# Patient Record
Sex: MALE | Race: WHITE
[De-identification: names, ages, dates, MRNs, and addresses within clinical notes are randomized per-mention and may not be internally consistent; named-entity substitution may affect disease eponyms.]

---

## 2019-09-27 ENCOUNTER — HOSPITAL ENCOUNTER (OUTPATIENT)
Dept: HOSPITAL 62 - OD | Age: 84
End: 2019-09-27
Attending: NURSE PRACTITIONER
Payer: OTHER GOVERNMENT

## 2019-09-27 DIAGNOSIS — Z79.01: ICD-10-CM

## 2019-09-27 DIAGNOSIS — Z51.81: Primary | ICD-10-CM

## 2019-09-27 DIAGNOSIS — Z92.29: ICD-10-CM

## 2019-09-27 LAB
INR PPP: 1.54
PROTHROMBIN TIME: 18.6 SEC (ref 11.4–15.4)

## 2019-09-27 PROCEDURE — 36415 COLL VENOUS BLD VENIPUNCTURE: CPT

## 2019-09-27 PROCEDURE — 85610 PROTHROMBIN TIME: CPT

## 2019-10-10 ENCOUNTER — HOSPITAL ENCOUNTER (EMERGENCY)
Dept: HOSPITAL 62 - ER | Age: 84
Discharge: HOME | End: 2019-10-10
Payer: OTHER GOVERNMENT

## 2019-10-10 VITALS — DIASTOLIC BLOOD PRESSURE: 81 MMHG | SYSTOLIC BLOOD PRESSURE: 155 MMHG

## 2019-10-10 DIAGNOSIS — Z79.899: ICD-10-CM

## 2019-10-10 DIAGNOSIS — I50.9: Primary | ICD-10-CM

## 2019-10-10 DIAGNOSIS — R06.01: ICD-10-CM

## 2019-10-10 DIAGNOSIS — R06.02: ICD-10-CM

## 2019-10-10 DIAGNOSIS — I25.2: ICD-10-CM

## 2019-10-10 DIAGNOSIS — J90: ICD-10-CM

## 2019-10-10 LAB
ADD MANUAL DIFF: NO
ALBUMIN SERPL-MCNC: 4 G/DL (ref 3.5–5)
ALP SERPL-CCNC: 84 U/L (ref 38–126)
ANION GAP SERPL CALC-SCNC: 10 MMOL/L (ref 5–19)
APTT BLD: 45.9 SEC (ref 23.5–35.8)
AST SERPL-CCNC: 20 U/L (ref 17–59)
BASOPHILS # BLD AUTO: 0 10^3/UL (ref 0–0.2)
BASOPHILS NFR BLD AUTO: 1 % (ref 0–2)
BILIRUB DIRECT SERPL-MCNC: 0.2 MG/DL (ref 0–0.4)
BILIRUB SERPL-MCNC: 0.9 MG/DL (ref 0.2–1.3)
BUN SERPL-MCNC: 34 MG/DL (ref 7–20)
CALCIUM: 9.4 MG/DL (ref 8.4–10.2)
CHLORIDE SERPL-SCNC: 102 MMOL/L (ref 98–107)
CK MB SERPL-MCNC: 2.46 NG/ML (ref ?–4.55)
CK SERPL-CCNC: 55 U/L (ref 55–170)
CO2 SERPL-SCNC: 27 MMOL/L (ref 22–30)
EOSINOPHIL # BLD AUTO: 0.1 10^3/UL (ref 0–0.6)
EOSINOPHIL NFR BLD AUTO: 1.6 % (ref 0–6)
ERYTHROCYTE [DISTWIDTH] IN BLOOD BY AUTOMATED COUNT: 17.7 % (ref 11.5–14)
GLUCOSE SERPL-MCNC: 165 MG/DL (ref 75–110)
HCT VFR BLD CALC: 34.1 % (ref 37.9–51)
HGB BLD-MCNC: 11 G/DL (ref 13.5–17)
INR PPP: 2.71
LYMPHOCYTES # BLD AUTO: 0.5 10^3/UL (ref 0.5–4.7)
LYMPHOCYTES NFR BLD AUTO: 11.6 % (ref 13–45)
MCH RBC QN AUTO: 26 PG (ref 27–33.4)
MCHC RBC AUTO-ENTMCNC: 32.4 G/DL (ref 32–36)
MCV RBC AUTO: 80 FL (ref 80–97)
MONOCYTES # BLD AUTO: 0.3 10^3/UL (ref 0.1–1.4)
MONOCYTES NFR BLD AUTO: 7.2 % (ref 3–13)
NEUTROPHILS # BLD AUTO: 3.6 10^3/UL (ref 1.7–8.2)
NEUTS SEG NFR BLD AUTO: 78.6 % (ref 42–78)
PLATELET # BLD: 209 10^3/UL (ref 150–450)
POTASSIUM SERPL-SCNC: 4.3 MMOL/L (ref 3.6–5)
PROT SERPL-MCNC: 7.2 G/DL (ref 6.3–8.2)
PROTHROMBIN TIME: 29.3 SEC (ref 11.4–15.4)
RBC # BLD AUTO: 4.24 10^6/UL (ref 4.35–5.55)
TOTAL CELLS COUNTED % (AUTO): 100 %
TROPONIN I SERPL-MCNC: 0.02 NG/ML
WBC # BLD AUTO: 4.6 10^3/UL (ref 4–10.5)

## 2019-10-10 PROCEDURE — 83880 ASSAY OF NATRIURETIC PEPTIDE: CPT

## 2019-10-10 PROCEDURE — 71046 X-RAY EXAM CHEST 2 VIEWS: CPT

## 2019-10-10 PROCEDURE — 85610 PROTHROMBIN TIME: CPT

## 2019-10-10 PROCEDURE — 82550 ASSAY OF CK (CPK): CPT

## 2019-10-10 PROCEDURE — 74018 RADEX ABDOMEN 1 VIEW: CPT

## 2019-10-10 PROCEDURE — 85025 COMPLETE CBC W/AUTO DIFF WBC: CPT

## 2019-10-10 PROCEDURE — 80053 COMPREHEN METABOLIC PANEL: CPT

## 2019-10-10 PROCEDURE — 93005 ELECTROCARDIOGRAM TRACING: CPT

## 2019-10-10 PROCEDURE — 82553 CREATINE MB FRACTION: CPT

## 2019-10-10 PROCEDURE — 84484 ASSAY OF TROPONIN QUANT: CPT

## 2019-10-10 PROCEDURE — 99283 EMERGENCY DEPT VISIT LOW MDM: CPT

## 2019-10-10 PROCEDURE — 93010 ELECTROCARDIOGRAM REPORT: CPT

## 2019-10-10 PROCEDURE — 82962 GLUCOSE BLOOD TEST: CPT

## 2019-10-10 PROCEDURE — 36415 COLL VENOUS BLD VENIPUNCTURE: CPT

## 2019-10-10 PROCEDURE — 96374 THER/PROPH/DIAG INJ IV PUSH: CPT

## 2019-10-10 PROCEDURE — 85730 THROMBOPLASTIN TIME PARTIAL: CPT

## 2019-10-10 NOTE — ER DOCUMENT REPORT
ED General





- General


Chief Complaint: Epigastric Pain


Stated Complaint: SHORTNESS OF BREATH


Time Seen by Provider: 10/10/19 14:00


Primary Care Provider: 


BLAKE KENDRICK NP [NO LOCAL MD] - Follow up as needed


Mode of Arrival: Ambulatory


TRAVEL OUTSIDE OF THE [place] IN LAST 30 DAYS: No





- HPI


Patient complains to provider of: SOB


Notes: 





Increasing shortness of breath and orthopnea for 2 weeks.  Patient has a lengthy

history of congestive heart failure.  He states he having difficulty sleeping 

secondary to liza shortness of breath but gets them in the middle of the night.

 Patient does take Lasix twice daily.  But he has been taking his last dose at 

11 PM.





She denies chest pain, fever, chills, cough.





- Related Data


Allergies/Adverse Reactions: 


                                        





No Known Allergies Allergy (Unverified 10/10/19 14:08)


   











Past Medical History





- General


Information source: Patient





- Social History


Smoking Status: Never Smoker


Chew tobacco use (# tins/day): No


Frequency of alcohol use: None


Drug Abuse: None


Family History: None


Patient has suicidal ideation: No


Patient has homicidal ideation: No





- Past Medical History


Cardiac Medical History: Reports: Hx Heart Attack





Review of Systems





- Review of Systems


Notes: 





REVIEW OF SYSTEMS:


CONSTITUTIONAL: -fevers, -chills


EENT: -eye pain, -difficulty swallowing, -nasal congestion


CARDIOVASCULAR: -chest pain, -syncope, positive orthopnea


RESPIRATORY: -cough, positive SOB


GASTROINTESTINAL: -abdominal pain, -nausea, -vomiting, -diarrhea


GENITOURINARY: -dysuria, -hematuria


MUSCULOSKELETAL: -back pain, -neck pain


SKIN: -rash or skin lesions.


HEMATOLOGIC: -easy bruising or bleeding.


LYMPHATIC: -swollen, enlarged glands.


NEUROLOGICAL: -altered mental status or loss of consciousness, -headache, -

neurologic symptoms


PSYCHIATRIC: -anxiety, -depression.


ALL OTHER SYSTEMS REVIEWED AND NEGATIVE.





Physical Exam





- Vital signs


Vitals: 


                                        











Temp Pulse Resp BP Pulse Ox


 


 97.5 F   70   16   125/63   98 


 


 10/10/19 14:00  10/10/19 14:00  10/10/19 14:00  10/10/19 14:00  10/10/19 14:00














- Notes


Notes: 





PHYSICAL EXAMINATION:


GENERAL: Well-appearing, well-nourished and in no acute distress.


HEAD: Atraumatic, normocephalic.


EYES: Pupils equal round and reactive to light, extraocular movements intact, 

sclera anicteric, conjunctiva are normal.


ENT: nares patent, oropharynx clear without exudates.  Moist mucous membranes.


NECK: Normal range of motion, supple without lymphadenopathy


LUNGS: Bilateral basilar crackles


HEART: Regular rate and rhythm without murmurs


ABDOMEN: Soft, nontender, normoactive bowel sounds.  No guarding, no rebound.  

No masses appreciated.


EXTREMITIES: Normal range of motion, no pitting or edema.  No cyanosis.


NEUROLOGICAL: Cranial nerves grossly intact.  Normal speech, normal gait.  

Normal sensory and motor exams.


PSYCH: Normal mood, normal affect.


SKIN: Warm, Dry, normal turgor, no rashes or lesions noted.





Course





- Re-evaluation


Re-evalutation: 





10/10/19 16:07


Well-appearing 85-year-old male presents with 2 weeks of increasing symptoms of 

orthopnea.


10/10/19 16:45





Patient's chest x-ray is some small pleural effusions but no other acute 

processes.  Patient is afebrile, no leukocytosis.  No cough.  No history of 

pneumonia.  Patient's EKG is nonischemic, troponin near normal.  Patient does 

have severe elevation is proBNP.





Patient is scheduled to see his family doctor on Monday.  Patient had his Lasix 

cut in half about 2 months ago.  He is now taking only 20 mg in the morning and 

20 mg afternoon.  Encourage patient to go back to 40 mg in the morning 40 mg at 

night.  Patient will be given IV dose of Lasix in the emergency department.  

Will be discharged home improved return if anything changes follow-up with PCP 

on Monday.





- Vital Signs


Vital signs: 


                                        











Temp Pulse Resp BP Pulse Ox


 


 97.5 F   70   14   135/75 H  98 


 


 10/10/19 14:00  10/10/19 14:00  10/10/19 15:01  10/10/19 15:01  10/10/19 15:01














- Laboratory


Result Diagrams: 


                                 10/10/19 14:55





                                 10/10/19 14:55


Laboratory results interpreted by me: 


                                        











  10/10/19 10/10/19 10/10/19





  14:55 14:55 14:55


 


RBC  4.24 L  


 


Hgb  11.0 L  


 


Hct  34.1 L  


 


MCH  26.0 L  


 


RDW  17.7 H  


 


Lymph % (Auto)  11.6 L  


 


Seg Neutrophils %  78.6 H  


 


APTT   45.9 H 


 


BUN    34 H


 


Creatinine    2.11 H


 


Est GFR ( Amer)    36 L


 


Est GFR (MDRD) Non-Af    30 L


 


Glucose    165 H


 


NT-Pro-B Natriuret Pep   














  10/10/19





  14:55


 


RBC 


 


Hgb 


 


Hct 


 


MCH 


 


RDW 


 


Lymph % (Auto) 


 


Seg Neutrophils % 


 


APTT 


 


BUN 


 


Creatinine 


 


Est GFR ( Amer) 


 


Est GFR (MDRD) Non-Af 


 


Glucose 


 


NT-Pro-B Natriuret Pep  34090 H














- EKG Interpretation by Me


Additional EKG results interpreted by me: 





10/10/19 15:58


Atrial sensed pacing rhythm 74 bpm, no ST elevations or depressions, no tacky 

dysrhythmias.





Discharge





- Discharge


Clinical Impression: 


CHF (congestive heart failure)


Qualifiers:


 Heart failure type: unspecified Heart failure chronicity: acute on chronic 

Qualified Code(s): I50.9 - Heart failure, unspecified





Condition: Stable


Disposition: HOME, SELF-CARE


Instructions:  Congestive Heart Failure ([facility])


Referrals: 


BLAKE KENDRICK NP [NO LOCAL MD] - Follow up as needed

## 2019-10-10 NOTE — RADIOLOGY REPORT (SQ)
EXAM DESCRIPTION:  KUB/ABDOMEN (SINGLE VIEW)



COMPLETED DATE/TIME:  10/10/2019 2:34 pm



REASON FOR STUDY:  ABD PRESSURE



COMPARISON:  None.



NUMBER OF VIEWS:  One view.



TECHNIQUE:   Supine radiographic image of the abdomen acquired.



LIMITATIONS:  None.



FINDINGS:  BOWEL GAS PATTERN: Normal bowel gas pattern. No dilated loops.

CALCIFICATIONS: No suspicious calcifications.

SOFT TISSUES: No gross mass or suggestion of organomegaly.

HARDWARE: None in the abdomen.

BONES: No acute fracture. No worrisome bone lesions.

OTHER: No other significant finding.



IMPRESSION:  NO RADIOGRAPHIC EVIDENCE FOR ACUTE ABDOMINAL DISEASE.



TECHNICAL DOCUMENTATION:  JOB ID:  [id number]

 2011 [employer]- All Rights Reserved



Reading location - IP/workstation name: [email or web address]

## 2019-10-10 NOTE — EKG REPORT
SEVERITY:- ABNORMAL ECG -

ATRIAL-SENSED VENTRICULAR-PACED COMPLEXES

PROBABLE LEFT ATRIAL ABNORMALITY

LEFT BUNDLE BRANCH BLOCK

:

Confirmed by: Lucas Reynoso MD 10-Oct-2019 19:00:33

## 2019-10-10 NOTE — ER DOCUMENT REPORT
ED Medical Screen (RME)





- General


Chief Complaint: Shortness Of Breath


Stated Complaint: SHORTNESS OF BREATH


Time Seen by Provider: 10/10/19 14:00


Primary Care Provider: 


BLAKE KENDRICK NP [Primary Care Provider] - Follow up as needed


Mode of Arrival: Ambulatory


Information source: Patient


Notes: 





This 85-year-old male with history of cardiac disease and diabetes presents 

emergency department with complaints of urinary frequency decreased sleep 

abdominal pressure for the past 2 weeks.  Denies chest pain.  Reports he tried 

to go to see his primary care provider but they were not able to see him and 

gave an appointment for Monday.  Reports his sugars have been in the 200s.








I have greeted and performed a rapid initial assessment of this patient.  A 

comprehensive ED assessment and evaluation of the patient, analysis of test 

results and completion of the medical decision making process will be conducted 

by additional ED providers.


Dictation of this chart was performed using voice recognition software; 

therefore, there may be some unintended grammatical errors.


TRAVEL OUTSIDE OF THE [place] IN LAST 30 DAYS: No





Doctor's Discharge





- Discharge


Referrals: 


BLAKE KENDRICK NP [Primary Care Provider] - Follow up as needed

## 2019-10-10 NOTE — RADIOLOGY REPORT (SQ)
EXAM DESCRIPTION:  CHEST 2 VIEWS



COMPLETED DATE/TIME:  10/10/2019 2:34 pm



REASON FOR STUDY:  ABD PAIN HX MI



COMPARISON:  None.



EXAM PARAMETERS:  NUMBER OF VIEWS: two views

TECHNIQUE: Digital Frontal and Lateral radiographic views of the chest acquired.

RADIATION DOSE: NA

LIMITATIONS: none



FINDINGS:  LUNGS AND PLEURA: Bilateral pleural effusions.  Airspace disease in each lower lobe.

MEDIASTINUM AND HILAR STRUCTURES: No masses or contour abnormalities.

HEART AND VASCULAR STRUCTURES: Heart size is borderline.  No liza pulmonary edema.

BONES: No acute findings.

HARDWARE: Pacemaker.  Sternotomy wires.

OTHER: No other significant finding.



IMPRESSION:  Borderline cardiomegaly with no pulmonary edema.  Small pleural effusions.  Airspace dis
ease in each lower lobe, atelectasis versus pneumonia.



TECHNICAL DOCUMENTATION:  JOB ID:  [id number]

 2011 [employer]- All Rights Reserved



Reading location - IP/workstation name: [email or web address]

## 2019-12-03 ENCOUNTER — HOSPITAL ENCOUNTER (OUTPATIENT)
Dept: HOSPITAL 62 - RAD | Age: 84
End: 2019-12-03
Attending: INTERNAL MEDICINE
Payer: OTHER GOVERNMENT

## 2019-12-03 DIAGNOSIS — I31.3: Primary | ICD-10-CM

## 2019-12-03 PROCEDURE — 82565 ASSAY OF CREATININE: CPT

## 2019-12-03 PROCEDURE — 71250 CT THORAX DX C-: CPT

## 2019-12-03 NOTE — RADIOLOGY REPORT (SQ)
EXAM DESCRIPTION:  CT CHEST WITHOUT



COMPLETED DATE/TIME:  12/3/2019 3:09 pm



REASON FOR STUDY:  I31.3 PERICARDIAL EFFUSION (NONINFLAMMATORY) I31.3  PERICARDIAL EFFUSION (NONINFLA
MMATORY)



COMPARISON:  Chest x-ray dated 10/10/2019



TECHNIQUE:  CT scan performed of the chest without intravenous contrast.  Images reviewed with lung, 
soft tissue and bone windows.  Reconstructed coronal and sagittal MPR images reviewed.  All images st
ored on PACS.

All CT scanners at this facility use dose modulation, iterative reconstruction, and/or weight based d
osing when appropriate to reduce radiation dose to as low as reasonably achievable (ALARA).

[facility]: Dose Right  [facility]: CareDose    [facility]: Dose Right    [facility]: Teradose 4D    [facility]: Smart Technologies



RADIATION DOSE:  CT Rad equipment meets quality standard of care and radiation dose reduction techniq
ues were employed. CTDIvol: 5.8 mGy. DLP: 245 mGy-cm. mGy.



LIMITATIONS:  No technical limitations.



FINDINGS:  LUNGS AND PLEURA: There are moderate bilateral pleural effusions right greater than left. 
 There is basilar atelectasis.

HILAR AND MEDIASTINAL STRUCTURES: No identified masses or abnormal nodes.  No obvious aneurysm.

HEART AND VASCULAR STRUCTURES: No pericardial effusion.  No aneurysm.  Extensive coronary artery calc
ification.

UPPER ABDOMEN: No significant findings.  Limited exam.

THYROID AND OTHER SOFT TISSUES: No masses.  No adenopathy.

BONES: No acute findings.

HARDWARE: Battery pack and leads are in place along with sternotomy wires.

OTHER: No other significant findings.



IMPRESSION:  Moderate bilateral pleural effusions right greater than left.  Bibasilar atelectasis.  N
o pericardial effusion.



TECHNICAL DOCUMENTATION:  JOB ID:  [id number]

Quality ID # [id number]: Final reports with documentation of one or more dose reduction techniques (e.g., Au
tomated exposure control, adjustment of the mA and/or kV according to patient size, use of iterative 
reconstruction technique)

 2011 [employer]- All Rights Reserved



Reading location - IP/workstation name: [email or web address]

## 2020-01-21 ENCOUNTER — HOSPITAL ENCOUNTER (OUTPATIENT)
Dept: HOSPITAL 62 - RAD | Age: 85
End: 2020-01-21
Attending: INTERNAL MEDICINE
Payer: OTHER GOVERNMENT

## 2020-01-21 DIAGNOSIS — Q61.02: ICD-10-CM

## 2020-01-21 DIAGNOSIS — K80.20: ICD-10-CM

## 2020-01-21 DIAGNOSIS — N18.4: Primary | ICD-10-CM

## 2020-01-21 DIAGNOSIS — J90: ICD-10-CM

## 2020-01-21 PROCEDURE — 76770 US EXAM ABDO BACK WALL COMP: CPT

## 2020-01-21 NOTE — RADIOLOGY REPORT (SQ)
EXAM DESCRIPTION:  U/S RETROPERITON (RENAL/AORTA)



COMPLETED DATE/TIME:  1/21/2020 3:55 pm



REASON FOR STUDY:  N18.4 CHRONIC KIDNEY DISEASE, STAGE 4 (SEVERE) N18.4  CHRONIC KIDNEY DISEASE, STAG
E 4 (SEVERE)



COMPARISON:  None.



TECHNIQUE:  Dynamic and static grayscale images acquired of the kidneys and bladder and recorded on P
ACS. Additional selected color Doppler and spectral images recorded.



LIMITATIONS:  None.



FINDINGS:  RIGHT KIDNEY:  8.2 cm.   Normal echogenicity.   No solid or suspicious masses.   No hydron
ephrosis.   No calcifications.

LEFT KIDNEY:  8.5 cm.   Normal echogenicity.   Cyst in the mid kidney measuring 1.6 cm and cyst in th
e lower pole measuring 2.8 cm.  No solid or suspicious masses.   No hydronephrosis.   No calcificatio
ns.

BLADDER: No masses.

OTHER FINDINGS: Bilateral pleural effusions.  Stones and sludge in the gallbladder.



IMPRESSION:

1. SMALL KIDNEYS.  CYSTS IN THE LEFT KIDNEY.  NO HYDRONEPHROSIS OR ACUTE FINDINGS.

2. PLEURAL EFFUSIONS.  STONES AND SLUDGE IN THE GALLBLADDER.



TECHNICAL DOCUMENTATION:  JOB ID:  [id number]

 2011 [employer]- All Rights Reserved



Reading location - IP/workstation name: [email or web address]

## 2020-04-21 ENCOUNTER — HOSPITAL ENCOUNTER (EMERGENCY)
Dept: HOSPITAL 62 - ER | Age: 85
LOS: 1 days | Discharge: ANOTHER HOSPITAL | End: 2020-04-22
Payer: OTHER GOVERNMENT

## 2020-04-21 DIAGNOSIS — N18.4: ICD-10-CM

## 2020-04-21 DIAGNOSIS — L97.519: ICD-10-CM

## 2020-04-21 DIAGNOSIS — L03.115: ICD-10-CM

## 2020-04-21 DIAGNOSIS — J44.9: ICD-10-CM

## 2020-04-21 DIAGNOSIS — Z95.0: ICD-10-CM

## 2020-04-21 DIAGNOSIS — M86.9: ICD-10-CM

## 2020-04-21 DIAGNOSIS — E10.22: ICD-10-CM

## 2020-04-21 DIAGNOSIS — I35.0: ICD-10-CM

## 2020-04-21 DIAGNOSIS — N17.9: ICD-10-CM

## 2020-04-21 DIAGNOSIS — I50.9: ICD-10-CM

## 2020-04-21 DIAGNOSIS — E10.621: Primary | ICD-10-CM

## 2020-04-21 DIAGNOSIS — L97.529: ICD-10-CM

## 2020-04-21 DIAGNOSIS — I48.91: ICD-10-CM

## 2020-04-21 DIAGNOSIS — Z95.5: ICD-10-CM

## 2020-04-21 DIAGNOSIS — I13.0: ICD-10-CM

## 2020-04-21 DIAGNOSIS — N40.0: ICD-10-CM

## 2020-04-21 DIAGNOSIS — Z79.4: ICD-10-CM

## 2020-04-21 DIAGNOSIS — Z79.899: ICD-10-CM

## 2020-04-21 DIAGNOSIS — Z79.82: ICD-10-CM

## 2020-04-21 DIAGNOSIS — E10.69: ICD-10-CM

## 2020-04-21 DIAGNOSIS — I25.10: ICD-10-CM

## 2020-04-21 DIAGNOSIS — I25.2: ICD-10-CM

## 2020-04-21 LAB
ABSOLUTE LYMPHOCYTES# (MANUAL): 0.2 10^3/UL (ref 0.5–4.7)
ABSOLUTE MONOCYTES # (MANUAL): 0.4 10^3/UL (ref 0.1–1.4)
ADD MANUAL DIFF: YES
ALBUMIN SERPL-MCNC: 3.1 G/DL (ref 3.5–5)
ALP SERPL-CCNC: 95 U/L (ref 38–126)
ANION GAP SERPL CALC-SCNC: 16 MMOL/L (ref 5–19)
ANISOCYTOSIS BLD QL SMEAR: (no result)
APTT BLD: 67.7 SEC (ref 23.5–35.8)
AST SERPL-CCNC: 18 U/L (ref 17–59)
BASOPHILS NFR BLD MANUAL: 0 % (ref 0–2)
BILIRUB DIRECT SERPL-MCNC: 0.2 MG/DL (ref 0–0.4)
BILIRUB SERPL-MCNC: 0.9 MG/DL (ref 0.2–1.3)
BUN SERPL-MCNC: 67 MG/DL (ref 7–20)
CALCIUM: 8.8 MG/DL (ref 8.4–10.2)
CHLORIDE SERPL-SCNC: 99 MMOL/L (ref 98–107)
CO2 SERPL-SCNC: 18 MMOL/L (ref 22–30)
EOSINOPHIL NFR BLD MANUAL: 0 % (ref 0–6)
ERYTHROCYTE [DISTWIDTH] IN BLOOD BY AUTOMATED COUNT: 16.3 % (ref 11.5–14)
GLUCOSE SERPL-MCNC: 189 MG/DL (ref 75–110)
HCT VFR BLD CALC: 29.9 % (ref 37.9–51)
HGB BLD-MCNC: 9.8 G/DL (ref 13.5–17)
INR PPP: 8.47
MCH RBC QN AUTO: 25.8 PG (ref 27–33.4)
MCHC RBC AUTO-ENTMCNC: 32.7 G/DL (ref 32–36)
MCV RBC AUTO: 79 FL (ref 80–97)
MONOCYTES % (MANUAL): 2 % (ref 3–13)
OVALOCYTES BLD QL SMEAR: SLIGHT
PLATELET # BLD: 307 10^3/UL (ref 150–450)
PLATELET COMMENT: ADEQUATE
POIKILOCYTOSIS BLD QL SMEAR: SLIGHT
POTASSIUM SERPL-SCNC: 3.5 MMOL/L (ref 3.6–5)
PROT SERPL-MCNC: 6.5 G/DL (ref 6.3–8.2)
PROTHROMBIN TIME: 72.9 SEC (ref 11.4–15.4)
RBC # BLD AUTO: 3.79 10^6/UL (ref 4.35–5.55)
SEGMENTED NEUTROPHILS % (MAN): 97 % (ref 42–78)
TOTAL CELLS COUNTED BLD: 100
VARIANT LYMPHS NFR BLD MANUAL: 1 % (ref 13–45)
WBC # BLD AUTO: 21.5 10^3/UL (ref 4–10.5)

## 2020-04-21 PROCEDURE — 87205 SMEAR GRAM STAIN: CPT

## 2020-04-21 PROCEDURE — 80053 COMPREHEN METABOLIC PANEL: CPT

## 2020-04-21 PROCEDURE — 85025 COMPLETE CBC W/AUTO DIFF WBC: CPT

## 2020-04-21 PROCEDURE — 85610 PROTHROMBIN TIME: CPT

## 2020-04-21 PROCEDURE — 93010 ELECTROCARDIOGRAM REPORT: CPT

## 2020-04-21 PROCEDURE — 87077 CULTURE AEROBIC IDENTIFY: CPT

## 2020-04-21 PROCEDURE — 73630 X-RAY EXAM OF FOOT: CPT

## 2020-04-21 PROCEDURE — 93005 ELECTROCARDIOGRAM TRACING: CPT

## 2020-04-21 PROCEDURE — 87040 BLOOD CULTURE FOR BACTERIA: CPT

## 2020-04-21 PROCEDURE — 82962 GLUCOSE BLOOD TEST: CPT

## 2020-04-21 PROCEDURE — 85730 THROMBOPLASTIN TIME PARTIAL: CPT

## 2020-04-21 PROCEDURE — 93306 TTE W/DOPPLER COMPLETE: CPT

## 2020-04-21 PROCEDURE — 87070 CULTURE OTHR SPECIMN AEROBIC: CPT

## 2020-04-21 PROCEDURE — 87186 SC STD MICRODIL/AGAR DIL: CPT

## 2020-04-21 PROCEDURE — 99285 EMERGENCY DEPT VISIT HI MDM: CPT

## 2020-04-21 PROCEDURE — 36415 COLL VENOUS BLD VENIPUNCTURE: CPT

## 2020-04-21 PROCEDURE — 96365 THER/PROPH/DIAG IV INF INIT: CPT

## 2020-04-21 PROCEDURE — 96361 HYDRATE IV INFUSION ADD-ON: CPT

## 2020-04-21 NOTE — ER DOCUMENT REPORT
ED Extremity Problem, Lower





- General


Information source: Patient


TRAVEL OUTSIDE OF THE [place] IN LAST 30 DAYS: No





- HPI


Patient complains to provider of: Pain


Location: Foot


Occurred: Last week


Onset/Duration: Worse


Quality of pain: Achy


Pain Level: 2


Recent injury: No


Associated symptoms: denies: Chills, Fever, Rapid heart rate


Exacerbated by: Movement, Walking


Relieved by: Nothing





<MARÍA BARRERA - Last Filed: 04/21/20 16:40>





<OPAL BENITEZ - Last Filed: 04/21/20 21:13>





<RHONDA HERNANDEZ - Last Filed: 04/22/20 00:33>





- General


Chief Complaint: Feet Swelling


Stated Complaint: FEET SWEELING


Time Seen by Provider: 04/21/20 08:11


Primary Care Provider: 


SUKHI MCKEE DO [Primary Care Provider] - Follow up as needed


Notes: 





Patient presents with diabetic foot wounds to bilateral feet right worse than 

left.  Patient states that he is not currently followed by podiatrist and does 

not see wound care.  Patient states that he has been performing vinegar and wa

ter soaks at home.  Patient denies any fever or currently taking any antibiotics

(BRUCE,KELTSSHERIDAN)





- Related Data


Allergies/Adverse Reactions: 


                                        





No Known Allergies Allergy (Verified 04/21/20 09:18)


   











Past Medical History





- General


Information source: Patient





- Social History


Smoking Status: Never Smoker


Chew tobacco use (# tins/day): No


Frequency of alcohol use: None


Drug Abuse: None


Occupation: none


Family History: None


Patient has suicidal ideation: No


Patient has homicidal ideation: No





- Past Medical History


Cardiac Medical History: Reports: Hx Congestive Heart Failure, Hx Coronary 

Artery Disease, Hx Heart Attack


Pulmonary Medical History: Reports: Hx COPD


Endocrine Medical History: Reports: Hx Diabetes Mellitus Type 1


Renal/ Medical History: Reports: Hx Benign Prostatic Hyperplasia, Hx Renal 

Insufficiency


Musculoskeletal Medical History: Reports Other - Charcot


Past Surgical History: Reports: Hx Coronary Artery Bypass Graft - x3, Hx 

Pacemaker, Hx Vascular Surgery - Carotid endarterectomy





<MARÍA BARRERA - Last Filed: 04/21/20 16:40>





Review of Systems





- Review of Systems


Constitutional: No symptoms reported.  denies: Fever


EENT: No symptoms reported


Cardiovascular: No symptoms reported.  denies: Chest pain


Respiratory: No symptoms reported.  denies: Cough, Short of breath


Gastrointestinal: No symptoms reported.  denies: Nausea, Vomiting


Genitourinary: No symptoms reported


Male Genitourinary: No symptoms reported


Musculoskeletal: Joint pain - Right foot


Skin: Change in color - Erythema to right foot, Other - Foot wounds bilateral 

feet


Hematologic/Lymphatic: No symptoms reported


Neurological/Psychological: No symptoms reported





<MARÍA BARRERA - Last Filed: 04/21/20 16:40>





Physical Exam





- General


General appearance: Appears well, Alert


In distress: None





- HEENT


Head: Normocephalic, Atraumatic


Nasal: Normal


Mouth/Lips: Normal


Neck: Normal, Supple





- Respiratory


Respiratory status: No respiratory distress


Chest status: Nontender


Breath sounds: Normal


Chest palpation: Normal





- Cardiovascular


Rhythm: Regular


Heart sounds: S1 appreciated, S2 appreciated





- Back


Back: Normal.  No: CVA tenderness





- Extremities


General upper extremity: Normal inspection, Normal ROM


Foot: Tender - Mild tenderness to right foot, bilateral feet with diabetic foot 

ulcers, erythema to right foot extending to the ankle





- Neurological


Neuro grossly intact: Yes


Cognition: Normal


Orientation: AAOx4


[place] Coma Scale Eye Opening: Spontaneous


[place] Coma Scale Verbal: Oriented


Alexia Coma Scale Motor: Obeys Commands


Alexia Coma Scale Total: 15





- Psychological


Associated symptoms: Normal affect, Normal mood





- Skin


Skin Temperature: Warm


Skin Moisture: Dry


Skin Color: Erythema - Erythema to right foot


Skin irregularity: other - Right foot with large diabetic foot wound to the n

eural aspect of fifth metatarsal, medial first metatarsal, and chronic appearing

crusted wound to the dorsal aspect of the right third toe, foot erythematous and

warm to touch.  Left foot with denuded area to the plantar surface of left third

toe and dorsal second toe, patient with diabetic foot wound to the lateral 

aspect of the distal fifth metatarsal.  Wounds to right foot malodorous.


Irregularity with: Tenderness, Warmth, Inflammation





<MARÍA BARRERA - Last Filed: 04/21/20 16:40>





- Vital signs


Vitals: 





                                        











Temp Pulse Resp BP Pulse Ox


 


 97.4 F   86   16   113/54 L  100 


 


 04/21/20 07:57  04/21/20 07:57  04/21/20 07:57  04/21/20 07:57  04/21/20 07:57














Course





- Laboratory


Result Diagrams: 


                                 04/21/20 08:50





                                 04/21/20 08:50





- Diagnostic Test


Radiology reviewed: Pending, Image reviewed





<MARÍA BARRERA - Last Filed: 04/21/20 16:40>





- Laboratory


Result Diagrams: 


                                 04/21/20 08:50





                                 04/21/20 08:50





<OPAL BENITEZ JUSTINA - Last Filed: 04/21/20 21:13>





- Laboratory


Result Diagrams: 


                                 04/21/20 08:50





                                 04/21/20 08:50





- Diagnostic Test


Radiology reviewed: Image reviewed, Reports reviewed





<LOLI HERNANDEZCLYDE HE - Last Filed: 04/22/20 00:33>





- Re-evaluation


Re-evalutation: 





04/21/20 09:55


Patient with cellulitis in the setting of chronic diabetic foot wounds.  Wound 

to the right foot worrisome for possible osteomyelitis although radiology report

is still pending at this time.  Patient without any fever although does have a 

leukocytosis with a shift.  Patient with a history of chronic kidney disease 

stage IV although appears to have acute on chronic renal failure at this time.  

When patient advised of his current BUN and creatinine results he did state that

they are higher than normal for him.  Patient also takes Coumadin daily and INR 

is currently pending at this time.  Call placed to hospitalist Dr. Cuevas, he will

return call for consultation shortly.


04/21/20 10:00


Consulted with surgeon Dr. Santiago, who agrees to come and evaluate patient


04/21/20 10:21


Consulted with Dr. Franco who agrees to accept patient as an [facility] admission at 

this time.


04/21/20 11:00


Hospitalist and surgeon both evaluated patient.  Hospitalist concerned about 

patient's multiple comorbidities and whether patient is suitable to be managed 

at this facility. Dr Santiago recommends consultation with anesthesiologist to see 

if they are comfortable managing patient.  Anesthesiologist recommends 

consultation with patient's cardiologist, call placed for consultation with Dr. Johnson, awaiting return call at this time.





04/21/20 11:32


Consulted with patient's cardiologist Dr. Johnson who recommends obtaining a stat 

echocardiogram as well as EKG.  He does not feel that patient requires transfer 

at this time, although if anesthesia is not comfortable performing sedation for 

the required procedure he states it would be up to them to arrange for transfer 

to a facility capable of performing the procedure. Dr Franco also spoke with 

Dr. Johnson and plans to admit the patient at this time.


04/21/20 11:50


Dr Franco reports that patient is now requesting transfer to facility that has 

higher level capability to manage his surgery and any potential complications.  

Spoke with patient regarding his request for transfer at this time.  Patient 

confirms that he would like to go to a facility that has more advanced 

capability in case he has any complication due to his cardiac arrest that he had

after having a carotid endarterectomy 5 months ago.  Explained to patient that 

his insurance may not cover the transfer fees as this is a patient elected 

transfer, patient acknowledges this and request transfer at this time.


04/21/20 12:01


Call placed to [facility] transfer [place], [facility] is not accepting pt requested 

transfers due to the Covid 19 outbreak. Pt advised that we may not be able to 

transfer him due to the corona virus pandemic. Pt requests call be made to 

[facility].


04/21/20 12:18


Call placed to the [facility] transfer center.  Staff there did not have records 

[facility], they request additional information to be 

sent to determine whether or not he is indeed a .  Patient updated 

regarding plan of care at this time.


04/21/20 12:28


Dr. Johnson and to evaluate patient.


04/21/20 13:23


[facility] called stating that they accidentally [facility] patient's demographic sheet and

are requesting an additional sheet be refaxed to them.  They still do not know 

if patient is in their system and are not requesting that his complete chart be 

faxed at this time.  They state that they will call back once they know whether 

or not he is in their system.


04/21/20 13:48


[facility] from [place] called requesting additional information.  Transfer center there 

states that they will contact the [facility] facility to confirm if patient

is associated with their facility.  Transfer center states that if he is not in 

the system that he would need to get a local primary care provider in the [facility] 

system and bring his  form to their facility before they would be able to 

assume care of him.


04/21/20 14:19


Spoke with the patient's wife Allen at patient's request to update her regarding 

plan of care at this time.  Made contact with the transfer center at the [facility] 

fac[facility] in [place] states that patient is in the [place] system and that 

they are in the process of contacting the [place] transfer center.  

Transfer center states that they will be in contact with additional information.


04/21/20 16:41


Report and handoff given to Opal Benitez NP.  (MARÍA BARRERA)





04/21/20 19:37


I called and spoke with a transfer center at the [facility] to hopefully get a 

update on the status of the patient requested transfer.  The transfer center 

reports that they are working on getting the patient's information to the 

attending physicians so that they can review his case and decide if it is 

appropriate for transfer.  They further stated to me that patient would have to 

have a negative COVID-19 test prior to transfer acceptance.





04/21/20 20:30


Surgicalist. Dr. Santiago came back to the bedside to evaluate the patient and get 

an update.  Since there is currently a hold up with the patient getting accepted

at the [facility] he feels that the patient needs to be admitted here so that we 

can start working on getting his INR to a therapeutic level so that the patient 

can go to the operating room in the morning.  This was discussed with the 

patient and the patient is in agreements with this plan.  Will call hospitalist 

to discuss.





04/21/20 20:45


Called and spoke with hospitalist, Dr. Nixon, he reviewed patient's chart.  

After reviewing all notes Dr. Nixon also feels it would be in the patient's 

best interest to be at a tertiary facility.  I will attempt to get patient 

transferred to a different facility.  





04/21/20 20:55


Discussed situation with patient.  Patient is agreeable to be admitted here to 

[place] or be transferred.  Will attempt to call [facility] at this time.





04/21/20 21:05


Currently awaiting callback from [facility] transfer 

Center.  I discussed this case with my attending physician, Dr. Hernandez, given 

extensive consults made with specialist on this patient Dr. Hernandez will take 

over care of this patient at this time.


 (OPAL BENITEZ)





- Vital Signs


Vital signs: 





                                        











Temp Pulse Resp BP Pulse Ox


 


 97.7 F   86   23 H  108/60   99 


 


 04/21/20 22:01  04/21/20 07:57  04/21/20 23:01  04/21/20 23:00  04/21/20 23:01














- Laboratory


Laboratory results interpreted by me: 





                                        











  04/21/20 04/21/20 04/21/20





  08:50 08:50 11:21


 


WBC  21.5 H  


 


RBC  3.79 L  


 


Hgb  9.8 L  


 


Hct  29.9 L  


 


MCV  79 L  


 


MCH  25.8 L  


 


RDW  16.3 H  


 


Seg Neuts % (Manual)  97 H  


 


Lymphocytes % (Manual)  1 L  


 


Monocytes % (Manual)  2 L  


 


Abs Neuts (Manual)  20.9 H  


 


Abs Lymphs (Manual)  0.2 L  


 


PT    72.9 H*


 


INR    8.47 H*


 


APTT    67.7 H


 


Sodium   132.7 L 


 


Potassium   3.5 L 


 


Carbon Dioxide   18 L 


 


BUN   67 H 


 


Creatinine   3.88 H 


 


Est GFR ( Amer)   18 L 


 


Est GFR (MDRD) Non-Af   15 L 


 


Glucose   189 H 


 


POC Glucose   


 


Albumin   3.1 L 














  04/21/20





  22:13


 


WBC 


 


RBC 


 


Hgb 


 


Hct 


 


MCV 


 


MCH 


 


RDW 


 


Seg Neuts % (Manual) 


 


Lymphocytes % (Manual) 


 


Monocytes % (Manual) 


 


Abs Neuts (Manual) 


 


Abs Lymphs (Manual) 


 


PT 


 


INR 


 


APTT 


 


Sodium 


 


Potassium 


 


Carbon Dioxide 


 


BUN 


 


Creatinine 


 


Est GFR ( Amer) 


 


Est GFR (MDRD) Non-Af 


 


Glucose 


 


POC Glucose  206 H


 


Albumin 














Discharge





- Discharge


Unit Admitted: IMCU





<MARÍA BARRERA - Last Filed: 04/21/20 16:40>





<OPAL BENITEZ - Last Filed: 04/21/20 21:13>





<RHONDA HERNANDEZ - Last Filed: 04/22/20 00:33>





- Discharge


Clinical Impression: 


 diabetic foot wounds





Cellulitis


Qualifiers:


 Site of cellulitis: extremity Site of cellulitis of extremity: lower extremity 

Laterality: right Qualified Code(s): L03.115 - Cellulitis of right lower limb





Acute on chronic kidney failure


Qualifiers:


 Acute renal failure type: unspecified Chronic kidney disease stage: stage 4 

(severe) Qualified Code(s): N17.9 - Acute kidney failure, unspecified





Osteomyelitis


Qualifiers:


 Osteomyelitis type: unspecified type Osteomyelitis location: foot Laterality: 

right Qualified Code(s): M86.9 - Osteomyelitis, unspecified





Condition: Stable


Disposition: ADMITTED AS INPATIENT


Referrals: 


SUKHI MCKEE DO [Primary Care Provider] - Follow up as needed

## 2020-04-21 NOTE — PDOC CONSULTATION
Consultation


Consult Date: 04/21/20


Attending physician:: RONNY WOLFE


Provider Consulted: DANII BERRY


Consult reason:: Preop cardiovascular evaluation





History of Present Illness


Admission Date/PCP: 


  





  SUKHI MCKEE DO





Patient complains of: Right foot pain


History of Present Illness: 


JHON RAMIREZ is a 85 year old male with diabetes presenting with self treated 

right lateral foot ulcer that has been present for several weeks with now foul-

smelling drainage and some discomfort.  Patient has a significant past medical 

history including what sounds like a carotid endarterectomy complicated by 

cardiac arrest and congestive heart failure about 5 months ago.  Patient also 

has chronic renal insufficiency and COPD.  No fever he has had foul-smelling 

drainage.


This history obtained by the surgeon was reviewed.  This was confirmed with the 

patient.  Patient has been evaluated in the past by my nurse practitioner in 

office setting.  Patient has known history of cardiomyopathy with depressed EF, 

coronary artery disease, atrial fibrillation, pacemaker placement, history of 

CABG.  His last EF was noted to be at around 35% with moderate aortic stenosis. 

Patient is physically inactive.  Currently denying any chest pain.  Emergency is

right foot wet gangrene.





Past Medical History


Cardiac Medical History: Reports: Congestive Heart Failure, Coronary Artery 

Disease, Myocardial Infarction


Pulmonary Medical History: Reports: Chronic Obstructive Pulmonary Disease (COPD)


Endocrine Medical History: Reports: Diabetes Mellitus Type 1


Musculoskeltal Medical History: Reports: Other - Charcot





Past Surgical History


Past Surgical History: Reports: Coronary Artery Bypass Graft - x3, Pacemaker, 

Vascular Surgery - Carotid endarterectomy





Social History


Information Source: Patient


Smoking Status: Never Smoker


Electronic Cigarette use?: No





- Advance Directive


Resuscitation Status: Full Code





Family History


Family History: None


Parental Family History Reviewed: Yes


Children Family History Reviewed: Yes


Sibling(s) Family History Reviewed.: Yes





Medication/Allergy


Home Medications: 








Albuterol Sulfate [Albuterol Sulfate Hfa] 8.5 gm IH Q6 04/21/20 


Amiodarone HCl [Cordarone 200 mg Tablet] 200 mg PO DAILY 04/21/20 


Ascorbic Acid [Vitamin C 500 mg Tablet] 500 mg PO DAILY 04/21/20 


Aspirin [Adult Aspirin Regimen] 81 mg PO DAILY 04/21/20 


Calcitriol [Rocaltrol 0.25 Mcg Capsule] 1 cap PO MOWEFR@1000 04/21/20 


Cholecalciferol (Vitamin D3) [Vitamin D3 1000 Unit Tablet] 1,000 unit PO DAILY 

04/21/20 


Collagenase Clostridium Hist. [Santyl Ointment 30 gm] 1 applic TP DAILY 04/21/20




Cyanocobalamin (Vitamin B-12) [Vitamin B-12] 1,000 mcg PO BID 04/21/20 


Insulin Glargine,Hum.rec.anlog [Lantus Insulin 100 Unit/mL Insulin Pen] 1 unit 

SUBCUT QHS 04/21/20 


Sacubitril/Valsartan [Entresto 24 mg/26 mg Tablet] 1 each PO Q12 04/21/20 


Sennosides [Senna Laxative] 17.2 mg PO DAILY 04/21/20 


Simvastatin 10 mg PO QHS 04/21/20 


Tamsulosin HCl [Flomax] 0.4 mg PO QHS 04/21/20 


Torsemide [Demadex 20 mg Tablet] 20 mg PO Q12 04/21/20 


Warfarin Sodium [Coumadin 2 mg Tablet] 1 mg PO .MOWETHSASU@1000 04/21/20 


Warfarin Sodium [Coumadin 2 mg Tablet] 2 mg PO TUFR@1000 04/21/20 








Allergies/Adverse Reactions: 


                                        





No Known Allergies Allergy (Verified 04/21/20 09:18)


   











Review of Systems


Constitutional: PRESENT: fever(s).  ABSENT: chills, headache(s), weight gain, 

weight loss


Eyes: ABSENT: visual disturbances


Ears: ABSENT: hearing changes


Cardiovascular: PRESENT: dyspnea on exertion, edema.  ABSENT: chest pain, 

orthropnea, palpitations


Respiratory: ABSENT: cough, hemoptysis


Gastrointestinal: ABSENT: abdominal pain, constipation, diarrhea, hematemesis, 

hematochezia, nausea, vomiting


Genitourinary: ABSENT: dysuria, hematuria


Musculoskeletal: PRESENT: other - Bilateral foot infection right worse than 

left.  ABSENT: joint swelling


Integumentary: ABSENT: rash, wounds


Neurological: ABSENT: abnormal gait, abnormal speech, confusion, dizziness, 

focal weakness, syncope


Psychiatric: ABSENT: anxiety, depression, homidical ideation, suicidal ideation


Endocrine: ABSENT: cold intolerance, heat intolerance, polydipsia, polyuria


Hematologic/Lymphatic: ABSENT: easy bleeding, easy bruising





Physical Exam


Vital Signs: 


                                        











Temp Pulse Resp BP Pulse Ox


 


 97.4 F   86   24 H  104/55 L  96 


 


 04/21/20 07:57  04/21/20 07:57  04/21/20 10:01  04/21/20 10:00  04/21/20 10:01








                                 Intake & Output











 04/20/20 04/21/20 04/22/20





 06:59 06:59 06:59


 


Intake Total   1000


 


Balance   1000


 


Weight   77.5 kg











General appearance: PRESENT: no acute distress, well-developed, well-nourished


Head exam: PRESENT: atraumatic, normocephalic


Eye exam: PRESENT: conjunctiva pink, EOMI, PERRLA.  ABSENT: scleral icterus


Ear exam: PRESENT: normal external ear exam


Mouth exam: PRESENT: moist, tongue midline


Neck exam: ABSENT: carotid bruit, JVD, lymphadenopathy, thyromegaly


Respiratory exam: PRESENT: clear to auscultation ilya.  ABSENT: rales, rhonchi, 

wheezes


Cardiovascular exam: PRESENT: RRR, +S1, +S2, systolic murmur - 2/6 ejection 

systolic murmur aortic area.  ABSENT: diastolic murmur, rubs


Pulses: PRESENT: other - Reduced lower extremity peripheral circulation noted.. 

ABSENT: normal dorsalis pedis pul


Vascular exam: PRESENT: other - Cellulitis noted..  ABSENT: normal capillary 

refill


GI/Abdominal exam: PRESENT: normal bowel sounds, soft.  ABSENT: distended, 

guarding, mass, organolmegaly, rebound, tenderness


Rectal exam: PRESENT: deferred


Extremities exam: PRESENT: full ROM, pedal edema, +1 edema, other - Right foot 

abscess, with cellulitis.  Left toe nonhealing ulcers..  ABSENT: calf 

tenderness, clubbing


Neurological exam: PRESENT: alert, awake, oriented to person, oriented to place,

oriented to time, oriented to situation, CN II-XII grossly intact.  ABSENT: 

motor sensory deficit


Psychiatric exam: PRESENT: appropriate affect, normal mood.  ABSENT: homicidal 

ideation, suicidal ideation


Skin exam: PRESENT: dry, intact, warm.  ABSENT: cyanosis, rash





Results


Laboratory Results: 


                                        





                                 04/21/20 08:50 





                                 04/21/20 08:50 





                                        











  04/21/20 04/21/20





  08:50 08:50


 


WBC  21.5 H 


 


RBC  3.79 L 


 


Hgb  9.8 L 


 


Hct  29.9 L 


 


MCV  79 L 


 


MCH  25.8 L 


 


MCHC  32.7 


 


RDW  16.3 H 


 


Plt Count  307 


 


Seg Neutrophils %  Not Reportable 


 


Sodium   132.7 L


 


Potassium   3.5 L


 


Chloride   99


 


Carbon Dioxide   18 L


 


Anion Gap   16


 


BUN   67 H


 


Creatinine   3.88 H


 


Est GFR (African Amer)   18 L


 


Glucose   189 H


 


Calcium   8.8


 


Total Bilirubin   0.9


 


AST   18


 


Alkaline Phosphatase   95


 


Total Protein   6.5


 


Albumin   3.1 L











EKG Comments: 





Ventricular paced rhythm.  Underlying atrial rhythm most likely atrial fibri

llation.


Impressions: 


                                        





Foot X-Ray  04/21/20 08:29


IMPRESSION:  1.  Cortical destruction and lucency centered around the distal 

aspect of the right 5th metatarsal and proximal phalanx compatible with 

osteomyelitis.  There is overlying soft tissue ulceration and defect with 

associated subcutaneous gas.


2.  Mild cortical indistinctness and lucency about the distal aspect of the left

5th metatarsal possibly representing an additional site of osteomyelitis.  MRI 

could be considered for confirmation.


3.  Left foot demonstrates midfoot collapse with extensive degenerative change 

suggestive of Charcot joint.


4.  Severe decreased mineralization.


 














Assessment & Plan





- Diagnosis


(1) Coronary artery disease


Qualifiers: 


   Coronary Disease-Associated Artery/Lesion type: unspecified vessel or lesion 

type   Native vs. transplanted heart: native heart   Associated angina: angina 

presence unspecified   Qualified Code(s): I25.10 - Atherosclerotic heart disease

of native coronary artery without angina pectoris   


Is this a current diagnosis for this admission?: Yes   





(2) Aortic stenosis


Qualifiers: 


   Cardiac valve disease etiology: nonrheumatic   Qualified Code(s): I35.0 - 

Nonrheumatic aortic (valve) stenosis   


Is this a current diagnosis for this admission?: Yes   





(3) Chronic systolic dysfunction of left ventricle


Is this a current diagnosis for this admission?: Yes   





(4) Acute on chronic kidney failure


Qualifiers: 


   Acute renal failure type: unspecified   Chronic kidney disease stage: stage 4

(severe)   Qualified Code(s): N17.9 - Acute kidney failure, unspecified; N18.4 -

Chronic kidney disease, stage 4 (severe)   


Is this a current diagnosis for this admission?: Yes   





(5) Cellulitis


Qualifiers: 


   Site of cellulitis: extremity   Site of cellulitis of extremity: lower 

extremity   Laterality: right   Qualified Code(s): L03.115 - Cellulitis of right

lower limb   





(6) Foot osteomyelitis, right


Qualifiers: 


   Osteomyelitis type: chronic, with draining sinus   Qualified Code(s): M86.471

- Chronic osteomyelitis with draining sinus, right ankle and foot   


Is this a current diagnosis for this admission?: Yes   





(7) Preoperative cardiovascular examination


Is this a current diagnosis for this admission?: Yes   





- Notes


Notes: 





Patient presents with significant severe infection of the right foot with 

underlying osteomyelitis, cellulitis and possibly wet gangrene of the right 

foot.  Patient may also have systemic inflammatory response syndrome.  Patient 

does have significant cardiac comorbidity including LV systolic dysfunction, 

moderate aortic stenosis, underlying atrial fibrillation as well as advanced 

renal failure.  In addition patient is very elderly and seems somewhat 

debilitated.  Overall perioperative risk is going to be high however option is 

worse without surgical intervention.  Therefore we may need to proceed with 

amputation surgery as recommended by the surgeon.  Amputation surgery can be pe

rformed under regional anesthesia.


Patient at risk for developing hypotension and cardiac decompensation postop 

therefore recommend recovery in ICU setting.  Should patient become hypotensive,

due to presence of aortic stenosis, initial option of pressure agent should be 

phenylephrine or Dylan-Synephrine.  Patient of course would benefit from broad-

spectrum antibiotics.  Overall agree with plans enumerated by the surgeon.


I am told by the ER physician and hospitalist that patient wishes to be akbar

sferred to tertiary care center.  This may or may not be possible in current 

situation with COVID-19.  I am available to help in any way I can.  I did order 

a 2D echo as I feel that this might help in perioperative care.


As usual I thank Dr. Echeverria and the ER physician for involving me in the care of

this patient.





- Time


Time Spent: 30 to 50 Minutes - More than 50% of the time spent coordinating 

care, discussing management plans with involved caregivers.  Management plans 

discussed with involved personnels.  Medical decision making was of moderate to 

high complexity, patient's has multiple  comorbidities.


Medications reviewed and adjusted accordingly: Yes

## 2020-04-21 NOTE — PDOC CONSULTATION
Consultation


Consult Date: 04/21/20


Provider Consulted: EVAN DIXON


Consult reason:: Evaluate right foot infection





History of Present Illness


Admission Date/PCP: 


  





  SUKHI MCKEE DO





Patient complains of: Right foot drainage and ulcer.


History of Present Illness: 


JHON RAMIREZ is a 85 year old male with diabetes presenting with self treated 

right lateral foot ulcer that has been present for several weeks with now foul-

smelling drainage and some discomfort.  Patient has a significant past medical 

history including what sounds like a carotid endarterectomy complicated by 

cardiac arrest and congestive heart failure about 5 months ago.  Patient also 

has chronic renal insufficiency and COPD.  No fever he has had foul-smelling 

drainage.








Past Medical History


Cardiac Medical History: Reports: Congestive Heart Failure, Coronary Artery 

Disease, Myocardial Infarction


Pulmonary Medical History: Reports: Chronic Obstructive Pulmonary Disease (COPD)


Endocrine Medical History: Reports: Diabetes Mellitus Type 1


Musculoskeltal Medical History: Reports: Other - Charcot





Past Surgical History


Past Surgical History: Reports: Coronary Artery Bypass Graft - x3, Pacemaker, 

Vascular Surgery - Carotid endarterectomy





Social History


Smoking Status: Never Smoker


Electronic Cigarette use?: No





Family History


Family History: None


Parental Family History Reviewed: No


Children Family History Reviewed: No


Sibling(s) Family History Reviewed.: No





Medication/Allergy


Allergies/Adverse Reactions: 


                                        





No Known Allergies Allergy (Verified 04/21/20 09:18)


   











Physical Exam


Vital Signs: 


                                        











Temp Pulse Resp BP Pulse Ox


 


 97.4 F   86   24 H  104/55 L  96 


 


 04/21/20 07:57  04/21/20 07:57  04/21/20 10:01  04/21/20 10:00  04/21/20 10:01








                                 Intake & Output











 04/20/20 04/21/20 04/22/20





 06:59 06:59 06:59


 


Intake Total   1000


 


Balance   1000


 


Weight   77.5 kg











General appearance: PRESENT: no acute distress, cooperative


Eye exam: PRESENT: conjunctiva pink


Respiratory exam: PRESENT: clear to auscultation ilya


Cardiovascular exam: PRESENT: RRR


Pulses: PRESENT: normal femoral pulses, other - Unable to palpate pedal pulses.


Extremities exam: PRESENT: other - Right foot with a 4 cm full-thickness 

ulceration at the lateral aspect beginning at the base of his fifth toe with 

drainage and a woodiness to the surrounding skin.  Diffuse right foot edema 

extending to the ankle.  Both of his feet are cool but not cold.  Diminished 

sensations in bilateral lower extremities.  On his left foot there are some 

superficial ulcerations but no drainage.  There is edema of his left foot as 

well.





Results


Laboratory Results: 


                                        





                                 04/21/20 08:50 





                                 04/21/20 08:50 





                                        











  04/21/20 04/21/20





  08:50 08:50


 


WBC  21.5 H 


 


RBC  3.79 L 


 


Hgb  9.8 L 


 


Hct  29.9 L 


 


MCV  79 L 


 


MCH  25.8 L 


 


MCHC  32.7 


 


RDW  16.3 H 


 


Plt Count  307 


 


Seg Neutrophils %  Not Reportable 


 


Sodium   132.7 L


 


Potassium   3.5 L


 


Chloride   99


 


Carbon Dioxide   18 L


 


Anion Gap   16


 


BUN   67 H


 


Creatinine   3.88 H


 


Est GFR (African Amer)   18 L


 


Glucose   189 H


 


Calcium   8.8


 


Total Bilirubin   0.9


 


AST   18


 


Alkaline Phosphatase   95


 


Total Protein   6.5


 


Albumin   3.1 L











Impressions: 


                                        





Foot X-Ray  04/21/20 08:29


IMPRESSION:  1.  Cortical destruction and lucency centered around the distal 

aspect of the right 5th metatarsal and proximal phalanx compatible with 

osteomyelitis.  There is overlying soft tissue ulceration and defect with 

associated subcutaneous gas.


2.  Mild cortical indistinctness and lucency about the distal aspect of the left

5th metatarsal possibly representing an additional site of osteomyelitis.  MRI 

could be considered for confirmation.


3.  Left foot demonstrates midfoot collapse with extensive degenerative change 

suggestive of Charcot joint.


4.  Severe decreased mineralization.


 














Assessment & Plan





- Diagnosis


(1) Foot osteomyelitis, right


Qualifiers: 


   Osteomyelitis type: chronic, with draining sinus   Qualified Code(s): M86.471

- Chronic osteomyelitis with draining sinus, right ankle and foot   


Is this a current diagnosis for this admission?: Yes   


Plan: 


Evidence of osteomyelitis of the right fifth toe and metatarsal head.  Patient 

would benefit from a trans-metatarsal amputation of the right fifth toe and 

local debridement.  However patient does have significant comorbidities that may

complicate his postoperative and his perioperative course.  Hospitalist and 

anesthesiology have both evaluated this patient and have above-mentioned 

concerns.  Discussions were made with the patient about whether he may be better

served at a tertiary care hospital with interventional cardiology and cardiac 

anesthesiology.  Decision is still pending about whether to keep him here or 

transfer the patient.  Will defer this decision to the patient and my medical 

colleagues.  If the patient is admitted here, surgery service will take him to 

surgery once cardiology has evaluated this patient and his clinical status has 

been optimized including reversal of his coagulation.  Once his right foot 

infection is under control, it would be worthwhile to get a left foot MRI.  He 

also has evidence of peripheral vascular disease and will also need vascular 

evaluation for possible angioplasty and stenting.

## 2020-04-21 NOTE — PDOC PROGRESS REPORT
Subjective


Progress Note for:: 04/21/20


Reason For Visit: 


BLOOD SUGAR PROBLEMS








Physical Exam


Vital Signs: 


                                        











Temp Pulse Resp BP Pulse Ox


 


 98.3 F   86   19   121/68   98 


 


 04/21/20 14:38  04/21/20 07:57  04/21/20 14:01  04/21/20 14:01  04/21/20 14:01








                                 Intake & Output











 04/20/20 04/21/20 04/22/20





 06:59 06:59 06:59


 


Intake Total   1000


 


Balance   1000


 


Weight   77.5 kg














Results


Laboratory Results: 


                                        





                                 04/21/20 08:50 





                                 04/21/20 08:50 





                                        











  04/21/20 04/21/20





  08:50 08:50


 


WBC  21.5 H 


 


RBC  3.79 L 


 


Hgb  9.8 L 


 


Hct  29.9 L 


 


MCV  79 L 


 


MCH  25.8 L 


 


MCHC  32.7 


 


RDW  16.3 H 


 


Plt Count  307 


 


Seg Neutrophils %  Not Reportable 


 


Sodium   132.7 L


 


Potassium   3.5 L


 


Chloride   99


 


Carbon Dioxide   18 L


 


Anion Gap   16


 


BUN   67 H


 


Creatinine   3.88 H


 


Est GFR (African Amer)   18 L


 


Glucose   189 H


 


Calcium   8.8


 


Total Bilirubin   0.9


 


AST   18


 


Alkaline Phosphatase   95


 


Total Protein   6.5


 


Albumin   3.1 L











Impressions: 


                                        





Foot X-Ray  04/21/20 08:29


IMPRESSION:  1.  Cortical destruction and lucency centered around the distal 

aspect of the right 5th metatarsal and proximal phalanx compatible with 

osteomyelitis.  There is overlying soft tissue ulceration and defect with 

associated subcutaneous gas.


2.  Mild cortical indistinctness and lucency about the distal aspect of the left

5th metatarsal possibly representing an additional site of osteomyelitis.  MRI 

could be considered for confirmation.


3.  Left foot demonstrates midfoot collapse with extensive degenerative change 

suggestive of Charcot joint.


4.  Severe decreased mineralization.


 














Assessment & Plan





- Diagnosis


(1) Foot osteomyelitis, right


Qualifiers: 


   Osteomyelitis type: subacute   Qualified Code(s): M86.271 - Subacute 

osteomyelitis, right ankle and foot   


Is this a current diagnosis for this admission?: Yes   


Plan: 


Attempt transfer was made as per patient request however no facility has 

accepted this patient.  [facility] 

I have discussed with the patient again the need for urgent surgery.  Although 

it would be ideal for him to be taken care of at a tertiary care center, he can 

receive appropriate care at our facility for this urgent problem.  He has agreed

to be admitted to the hospital and undergo reversal of his Coumadin anticoag

ulation so that he can undergo surgery at our facility.  [facility] agrees [facility] 

[facility] thing tomorrow morning, we can still transfer him.

## 2020-04-21 NOTE — ER DOCUMENT REPORT
ED Medical Screen (RME)





- General


Chief Complaint: Feet Swelling


Stated Complaint: FEET SWEELING


Time Seen by Provider: 04/21/20 08:11


Primary Care Provider: 


SUKHI MCKEE DO [Primary Care Provider] - Follow up as needed


Notes: 





I was approached to help with this patient did about 9:30 PM by physician 

assistant Ann Marie.  The patient is been here for 13 hours after being diagnosed 

with an infected foot sepsis and the need for an amputation.  He has been seen 

by medicine anesthesia surgery and cardiology and was supposed to be admitted to

the hospitalist but I discussed with Dr. Nixon and he is uncomfortable adm

itting the patient given a cardiac arrest in the last several months, and what 

he describes as the cardiologist opinion that the patient requires tertiary care

and the surgeon's opinion that the patient will require vascular surgery.  We 

have opted to transfer the patient and he was discussed with Dr. Mitchell Lares 

from [facility] who will accept him for transfer.  His 

INR was elevated and based on previous providers research/recommendations his 

Coumadin has been held but no reversal has been done given that he had a recent 

an endarterectomy.  He is stable with no active bleeding.  He is stable for 

transfer.


TRAVEL OUTSIDE OF THE [place] IN LAST 30 DAYS: No





- Related Data


Allergies/Adverse Reactions: 


                                        





No Known Allergies Allergy (Verified 04/21/20 09:18)


   








Home Medications: calcitrol 1 tab 0.25 mcg 3 times a week.  warfarin 1-2 tabs 

2mg 1 per day as directed.  amiodorone 1 tab 200mg 1tab per day.  tamsulosin 1 

tab 0.4mg 1 at bedtime.  torsemide 1 tab 20mg 2 tabs per day.  simvastatin 

1/2tab, 20mg 1tab at bedtime.  albuterol inhl 90mcg, 4 per day.  insulin, 

glargine-lantus solostar 1unit, 100unit/ml 3ml at bedtime.  collagnenase top 

oint 250units apply to feet.  aspirin 81mg, 1 a day.  senna laxative 2 tabs per 

day.  vit d3, 1000mg, one day.  vit c  1000mg, one a day.  vit b12





Past Medical History





- Social History


Chew tobacco use (# tins/day): No


Frequency of alcohol use: None


Drug Abuse: None





- Past Medical History


Cardiac Medical History: Reports: Hx Congestive Heart Failure, Hx Coronary 

Artery Disease, Hx Heart Attack


Pulmonary Medical History: Reports: Hx COPD


Endocrine Medical History: Reports: Hx Diabetes Mellitus Type 1


Renal/ Medical History: Reports: Hx Benign Prostatic Hyperplasia, Hx Renal 

Insufficiency


Musculoskeltal Medical History: Reports Other - Charcot


Past Surgical History: Reports: Hx Coronary Artery Bypass Graft - x3, Hx 

Pacemaker, Hx Vascular Surgery - Carotid endarterectomy





Physical Exam





- Vital signs


Vitals: 





                                        











Temp Pulse Resp BP Pulse Ox


 


 97.4 F   86   16   113/54 L  100 


 


 04/21/20 07:57  04/21/20 07:57  04/21/20 07:57  04/21/20 07:57  04/21/20 07:57














Course





- Vital Signs


Vital signs: 





                                        











Temp Pulse Resp BP Pulse Ox


 


 97.7 F   86   19   123/64   97 


 


 04/21/20 22:01  04/21/20 07:57  04/21/20 22:01  04/21/20 22:00  04/21/20 22:01














- Laboratory


Result Diagrams: 


                                 04/21/20 08:50





                                 04/21/20 08:50


Laboratory results interpreted by me: 





                                        











  04/21/20 04/21/20 04/21/20





  08:50 08:50 11:21


 


WBC  21.5 H  


 


RBC  3.79 L  


 


Hgb  9.8 L  


 


Hct  29.9 L  


 


MCV  79 L  


 


MCH  25.8 L  


 


RDW  16.3 H  


 


Seg Neuts % (Manual)  97 H  


 


Lymphocytes % (Manual)  1 L  


 


Monocytes % (Manual)  2 L  


 


Abs Neuts (Manual)  20.9 H  


 


Abs Lymphs (Manual)  0.2 L  


 


PT    72.9 H*


 


INR    8.47 H*


 


APTT    67.7 H


 


Sodium   132.7 L 


 


Potassium   3.5 L 


 


Carbon Dioxide   18 L 


 


BUN   67 H 


 


Creatinine   3.88 H 


 


Est GFR ( Amer)   18 L 


 


Est GFR (MDRD) Non-Af   15 L 


 


Glucose   189 H 


 


POC Glucose   


 


Albumin   3.1 L 














  04/21/20





  22:13


 


WBC 


 


RBC 


 


Hgb 


 


Hct 


 


MCV 


 


MCH 


 


RDW 


 


Seg Neuts % (Manual) 


 


Lymphocytes % (Manual) 


 


Monocytes % (Manual) 


 


Abs Neuts (Manual) 


 


Abs Lymphs (Manual) 


 


PT 


 


INR 


 


APTT 


 


Sodium 


 


Potassium 


 


Carbon Dioxide 


 


BUN 


 


Creatinine 


 


Est GFR ( Amer) 


 


Est GFR (MDRD) Non-Af 


 


Glucose 


 


POC Glucose  206 H


 


Albumin 














Doctor's Discharge





- Discharge


Clinical Impression: 


 diabetic foot wounds





Cellulitis


Qualifiers:


 Site of cellulitis: extremity Site of cellulitis of extremity: lower extremity 

Laterality: right Qualified Code(s): L03.115 - Cellulitis of right lower limb





Acute on chronic kidney failure


Qualifiers:


 Acute renal failure type: unspecified Chronic kidney disease stage: stage 4 

(severe) Qualified Code(s): N17.9 - Acute kidney failure, unspecified





Osteomyelitis


Qualifiers:


 Osteomyelitis type: unspecified type Osteomyelitis location: foot Laterality: 

right Qualified Code(s): M86.9 - Osteomyelitis, unspecified





Condition: Stable


Disposition: ADMITTED AS INPATIENT


Referrals: 


SUKHI MCKEE DO [Primary Care Provider] - Follow up as needed

## 2020-04-21 NOTE — RADIOLOGY REPORT (SQ)
EXAM DESCRIPTION:  FOOT BILATERAL 3 VIEWS



IMAGES COMPLETED DATE/TIME:  4/21/2020 9:30 am



REASON FOR STUDY:  diabetic foot wounds bilat



COMPARISON:  None.



NUMBER OF VIEWS:  Three views.



TECHNIQUE:  AP, lateral and oblique  radiographic images acquired of the right and left foot.



LIMITATIONS:  None.



FINDINGS:  RIGHT:

MINERALIZATION: Decreased.

BONES: There is cortical destruction and lucency centered around the distal aspect of the 5th metatar
sal.  Additional cortical lucency noted along the lateral aspect of the proximal 5th phalanx.  No dis
crete fracture.  Degenerative changes with joint space loss, osteophytosis and subchondral sclerosis 
at multiple joints.

JOINTS: No dislocation

SOFT TISSUES: Soft tissue ulceration and a likely punctate foci of subcutaneous gas along the lateral
 midfoot.

OTHER: Vascular calcifications.

LEFT:

MINERALIZATION: Normal.

BONES: Mild cortical indistinctness and lucency about the distal aspect of the 5th metatarsal.  No de
finitive fracture.  Degenerative changes with joint space loss, subchondral sclerosis and osteophytos
is about multiple joints.  There is midfoot collapse and extensive degenerative changes suggestive of
 Charcot joint.

JOINTS: Midfoot collapse with flatfoot deformity and extensive degenerative change.

SOFT TISSUES: Vascular calcifications.  Questionable soft tissue ulceration along the lateral aspect 
of the forefoot

OTHER: No other significant finding.



IMPRESSION:  1.  Cortical destruction and lucency centered around the distal aspect of the right 5th 
metatarsal and proximal phalanx compatible with osteomyelitis.  There is overlying soft tissue ulcera
tion and defect with associated subcutaneous gas.

2.  Mild cortical indistinctness and lucency about the distal aspect of the left 5th metatarsal possi
salvador representing an additional site of osteomyelitis.  MRI could be considered for confirmation.

3.  Left foot demonstrates midfoot collapse with extensive degenerative change suggestive of Charcot 
joint.

4.  Severe decreased mineralization.



TECHNICAL DOCUMENTATION:  JOB ID:  [id number]

 2011 [employer]- All Rights Reserved



Reading location - IP/workstation name: [email or web address]

## 2020-04-21 NOTE — XCELERA REPORT
[address] [address]

                               Tel: [phone]

                               Fax: [phone]



                      Transthoracic Echocardiogram Report

_______________________________________________________________________________



Name: JHON RAMIREZ

MRN: [id number]                                Age: 85 yrs

Gender: Male                                   : 1934

Patient Status: Emergency                      Patient Location: ER

Account #: [id number]

Study Date: 2020 02:51 PM

Accession #: [id number]

_______________________________________________________________________________



Height: 70 in        Weight: 170 lb        BSA: 1.9 m2

_______________________________________________________________________________

Procedure: A complete two-dimensional transthoracic echocardiogram was

performed (2D, M-mode, spectral and color flow Doppler). The study was

technically difficult with many images being suboptimal in quality.

Reason For Study: pre operative





Ordering Physician: MARÍA BARRERA

Performed By: Tala Pardo



_______________________________________________________________________________



Interpretation Summary

LEFT VENTRICLE:

LV Systolic function: LVEF is felt to be moderately depressed with best

estimate being approximately 35%.

LV Diastolic Function: Grade II diastolic dysfunction noted.

Wall motion : cannot comment accurately on wall motion. Moderate diffuse

hypokinesia noted. Apical movement consistent with pacemaker activation.

Left ventricular chamber size : mildly dilated.

Left ventricular wall thickness : is increased indicative of Mild LVH.



RIGHT VENTRICLE:

RV systolic function : is felt to be within normal limit.

Right Ventricle Size : mildly dilated



LEFT ATRIUM size : moderately dilated.



RIGHT ATRIUM size : mildly dilated.



INTER ATRIAL SEPTUM : No definite atrial septal defect noted however a small

PFO could be missed.



AORTIC ROOT : seems to be within normal limits.



Ascending aorta is not well visualized.



INFERIOR VENA CAVA: normal size but with reduced respiratory variation.



VALVES:



MITRAL VALVE : thickened and mildly calcified with preserved mobility.

Mitral Regurgitation : mild mitral regurgitation is noted.

Mitral Stenosis: No mitral stenosis noted.

Mitral valve prolapse : none noted.



AORTIC VALVE: seems to be trileaflet with moderate thickening and

calcification. Mild to moderately reduced mobility.

Aortic stenosis : mild to moderate aortic stenosis based on mobility. peak

gradient 15 to 20 mmHg.

Aortic regurgitation : mild aortic incompetence noted.



TRICUSPID VALVE : mobility and structures within normal limit.

Tricuspid stenosis : no tricuspid stenosis noted.

Tricuspid regurgitation : mild tricuspid regurgitation noted.

Estimated RVSP : estimated at approximately 60 mmHg consistent with moderate

to severe pulmonary hypertension.



PULMONARY VALVE : was not well visualized but no significant abnormalities

suspected.

Pulmonary stenosis : no pulmonary stenosis noted.

Pulmonary regurgitation : no significant pulmonary regurgitation noted.



MASSES AND THROMBUS : No definite intracardiac thrombus or masses are noted.

Incidental note of pacemaker were noted traversing the tricuspid valve.



PERICARDIUM: minimal pericardial effusion was noted.



PLEURAL EFFUSION: small left pleural effusion noted.



IMPRESSION :

1. Depressed LVEF. Best estimate is 35%. Clinical correlation is requested.

2. Mild LVH noted

3. Grade II [mild] Diastolic Dysfunction noted.

4. Mild to moderate aortic stenosis, mild mitral and mild tricuspid

regurgitation noted.

5. LA Is Mildly Dilated, LV mildly dilated, RA mildly dilated.

6. Moderate to severe pulmonary hypertension noted.



MMode/2D Measurements & Calculations

RVDd: 4.2 cm     LVIDd: 5.8 cm        FS: 16.7 %          Ao root diam: 2.8 cm

IVSd: 0.99 cm    LVIDs: 4.9 cm        EDV(Teich):         Ao root area:

                                      168.7 ml

                 LVPWd: 0.99 cm                           6.2 cm2

                                      ESV(Teich):         LA dimension: 4.7 cm

                                      110.7 ml

                                      EF(Teich): 34.4 %

        _______________________________________________________________

LVOT diam: 2.0 cmLVLd ap4: 7.7 cm     SV(MOD-sp4):

LVOT area:       EDV(MOD-sp4):        28.0 ml

3.0 cm2          114.0 ml

                 LVLs ap4: 7.2 cm

                 ESV(MOD-sp4): 86.0 ml

                 EF(MOD-sp4): 24.6 %



Doppler Measurements & Calculations

MV E max kayla:       MV V2 max:        MV P1/2t max kayla:    Ao V2 max:

278.0 cm/sec        272.6 cm/sec      271.5 cm/sec         194.1 cm/sec

                    MV max PG:        MV P1/2t: 68.1 msec  Ao max P.7 mmHg                              15.1 mmHg

                                      MVA(P1/2t): 3.2 cm2

                    MV V2 mean:       MV dec slope:        CURT(V,D): 1.4 cm2

                    182.6 cm/sec      1167 cm/sec2

                    MV mean P.1 mmHg

                    MV V2 VTI: 50.8 cm

                    MVA(VTI): 0.96 cm2

        _______________________________________________________________



AI max kayla:         LV V1 max PG:     SV(LVOT): 48.9 ml    PA V2 max:

297.1 cm/sec        3.3 mmHg                               90.8 cm/sec

AI max P.3 mmHgLV V1 mean PG:                         PA max PG: 3.3 mmHg

AI dec slope:       1.7 mmHg

267.7 cm/sec2       LV V1 max:

AI P1/2t: 325.1 msec91.1 cm/sec

                    LV V1 mean:

                    60.3 cm/sec

                    LV V1 VTI: 16.3 cm

        _______________________________________________________________

PI end-d kayla:       TR max kayla:       AV P1/2t-pr_phl:     MV P1/2t-pr_phl:

202.2 cm/sec        389.6 cm/sec      325.1 msec           68.1 msec

                    TR max P.7 mmHg





_______________________________________________________________________________

_______________________________________________________________________________

Electronically signed by:      Delma Johnson      on 2020 07:10 PM



CC: MARÍA BARRERA Shyamal

## 2020-04-22 VITALS — DIASTOLIC BLOOD PRESSURE: 61 MMHG | SYSTOLIC BLOOD PRESSURE: 120 MMHG

## 2020-04-22 NOTE — EKG REPORT
SEVERITY:- ABNORMAL ECG -

VENTRICULAR-PACED COMPLEXES

:

Confirmed by: Delma Johnson 22-Apr-2020 07:22:59

## 2020-06-05 ENCOUNTER — HOSPITAL ENCOUNTER (INPATIENT)
Dept: HOSPITAL 62 - ER | Age: 85
LOS: 3 days | DRG: 871 | End: 2020-06-08
Attending: NURSE PRACTITIONER | Admitting: INTERNAL MEDICINE
Payer: OTHER GOVERNMENT

## 2020-06-05 DIAGNOSIS — E10.622: ICD-10-CM

## 2020-06-05 DIAGNOSIS — E10.69: ICD-10-CM

## 2020-06-05 DIAGNOSIS — E10.22: ICD-10-CM

## 2020-06-05 DIAGNOSIS — J44.9: ICD-10-CM

## 2020-06-05 DIAGNOSIS — Z79.01: ICD-10-CM

## 2020-06-05 DIAGNOSIS — Z79.82: ICD-10-CM

## 2020-06-05 DIAGNOSIS — Z51.5: ICD-10-CM

## 2020-06-05 DIAGNOSIS — F03.90: ICD-10-CM

## 2020-06-05 DIAGNOSIS — I50.9: ICD-10-CM

## 2020-06-05 DIAGNOSIS — Z79.4: ICD-10-CM

## 2020-06-05 DIAGNOSIS — E10.52: ICD-10-CM

## 2020-06-05 DIAGNOSIS — Z89.619: ICD-10-CM

## 2020-06-05 DIAGNOSIS — I25.2: ICD-10-CM

## 2020-06-05 DIAGNOSIS — L89.154: ICD-10-CM

## 2020-06-05 DIAGNOSIS — A41.9: Primary | ICD-10-CM

## 2020-06-05 DIAGNOSIS — Z95.0: ICD-10-CM

## 2020-06-05 DIAGNOSIS — M86.38: ICD-10-CM

## 2020-06-05 DIAGNOSIS — N18.6: ICD-10-CM

## 2020-06-05 DIAGNOSIS — I95.3: ICD-10-CM

## 2020-06-05 DIAGNOSIS — R65.20: ICD-10-CM

## 2020-06-05 DIAGNOSIS — I25.10: ICD-10-CM

## 2020-06-05 DIAGNOSIS — Z66: ICD-10-CM

## 2020-06-05 DIAGNOSIS — Z79.899: ICD-10-CM

## 2020-06-05 DIAGNOSIS — F32.9: ICD-10-CM

## 2020-06-05 DIAGNOSIS — Z99.2: ICD-10-CM

## 2020-06-05 DIAGNOSIS — K72.00: ICD-10-CM

## 2020-06-05 DIAGNOSIS — Z95.1: ICD-10-CM

## 2020-06-05 DIAGNOSIS — I96: ICD-10-CM

## 2020-06-05 LAB
ABSOLUTE LYMPHOCYTES# (MANUAL): 0 10^3/UL (ref 0.5–4.7)
ABSOLUTE MONOCYTES # (MANUAL): 0.2 10^3/UL (ref 0.1–1.4)
ADD MANUAL DIFF: YES
ALBUMIN SERPL-MCNC: 2.4 G/DL (ref 3.5–5)
ALP SERPL-CCNC: 157 U/L (ref 38–126)
ANION GAP SERPL CALC-SCNC: 16 MMOL/L (ref 5–19)
ANISOCYTOSIS BLD QL SMEAR: (no result)
APPEARANCE UR: (no result)
APTT PPP: YELLOW S
ARTERIAL BLOOD FIO2: (no result)
ARTERIAL BLOOD H2CO3: 0.83 MMOL/L (ref 1.05–1.35)
ARTERIAL BLOOD HCO3: 16 MMOL/L (ref 20–24)
ARTERIAL BLOOD PCO2: 27.5 MMHG (ref 35–45)
ARTERIAL BLOOD PH: 7.38 (ref 7.35–7.45)
ARTERIAL BLOOD PO2: 70.5 MMHG (ref 80–100)
ARTERIAL BLOOD TOTAL CO2: 16.8 MMOL/L (ref 23–27)
AST SERPL-CCNC: 5064 U/L (ref 17–59)
BASE EXCESS BLDA CALC-SCNC: -8 MMOL/L
BASOPHILS NFR BLD MANUAL: 0 % (ref 0–2)
BILIRUB DIRECT SERPL-MCNC: 1 MG/DL (ref 0–0.4)
BILIRUB SERPL-MCNC: 1.5 MG/DL (ref 0.2–1.3)
BILIRUB UR QL STRIP: NEGATIVE
BUN SERPL-MCNC: 23 MG/DL (ref 7–20)
BURR CELLS BLD QL SMEAR: SLIGHT
CALCIUM: 8.4 MG/DL (ref 8.4–10.2)
CHLORIDE SERPL-SCNC: 97 MMOL/L (ref 98–107)
CO2 SERPL-SCNC: 19 MMOL/L (ref 22–30)
DACRYOCYTES BLD QL SMEAR: SLIGHT
EOSINOPHIL NFR BLD MANUAL: 1 % (ref 0–6)
ERYTHROCYTE [DISTWIDTH] IN BLOOD BY AUTOMATED COUNT: 19.8 % (ref 11.5–14)
GLUCOSE SERPL-MCNC: 130 MG/DL (ref 75–110)
GLUCOSE UR STRIP-MCNC: NEGATIVE MG/DL
HCT VFR BLD CALC: 27.7 % (ref 37.9–51)
HGB BLD-MCNC: 8.4 G/DL (ref 13.5–17)
INR PPP: 3.32
KETONES UR STRIP-MCNC: (no result) MG/DL
MCH RBC QN AUTO: 26.1 PG (ref 27–33.4)
MCHC RBC AUTO-ENTMCNC: 30.5 G/DL (ref 32–36)
MCV RBC AUTO: 86 FL (ref 80–97)
MONOCYTES % (MANUAL): 1 % (ref 3–13)
OVALOCYTES BLD QL SMEAR: (no result)
PH UR STRIP: 5 [PH] (ref 5–9)
PLATELET # BLD: 166 10^3/UL (ref 150–450)
PLATELET COMMENT: ADEQUATE
POIKILOCYTOSIS BLD QL SMEAR: (no result)
POLYCHROMASIA BLD QL SMEAR: (no result)
POTASSIUM SERPL-SCNC: 3.9 MMOL/L (ref 3.6–5)
PROT SERPL-MCNC: 4.7 G/DL (ref 6.3–8.2)
PROT UR STRIP-MCNC: 100 MG/DL
PROTHROMBIN TIME: 34.5 SEC (ref 11.4–15.4)
RBC # BLD AUTO: 3.23 10^6/UL (ref 4.35–5.55)
SAO2 % BLDA: 94.3 % (ref 94–98)
SCHISTOCYTES BLD QL SMEAR: SLIGHT
SEGMENTED NEUTROPHILS % (MAN): 98 % (ref 42–78)
SP GR UR STRIP: 1.02
TARGETS BLD QL SMEAR: SLIGHT
TOTAL CELLS COUNTED BLD: 100
UROBILINOGEN UR-MCNC: NEGATIVE MG/DL (ref ?–2)
VARIANT LYMPHS NFR BLD MANUAL: 0 % (ref 13–45)
WBC # BLD AUTO: 22.4 10^3/UL (ref 4–10.5)
WBC TOXIC VACUOLES BLD QL SMEAR: PRESENT

## 2020-06-05 PROCEDURE — 96365 THER/PROPH/DIAG IV INF INIT: CPT

## 2020-06-05 PROCEDURE — 36415 COLL VENOUS BLD VENIPUNCTURE: CPT

## 2020-06-05 PROCEDURE — 99285 EMERGENCY DEPT VISIT HI MDM: CPT

## 2020-06-05 PROCEDURE — 87086 URINE CULTURE/COLONY COUNT: CPT

## 2020-06-05 PROCEDURE — 85610 PROTHROMBIN TIME: CPT

## 2020-06-05 PROCEDURE — 81001 URINALYSIS AUTO W/SCOPE: CPT

## 2020-06-05 PROCEDURE — 80053 COMPREHEN METABOLIC PANEL: CPT

## 2020-06-05 PROCEDURE — 96367 TX/PROPH/DG ADDL SEQ IV INF: CPT

## 2020-06-05 PROCEDURE — 85025 COMPLETE CBC W/AUTO DIFF WBC: CPT

## 2020-06-05 PROCEDURE — 36600 WITHDRAWAL OF ARTERIAL BLOOD: CPT

## 2020-06-05 PROCEDURE — 82803 BLOOD GASES ANY COMBINATION: CPT

## 2020-06-05 PROCEDURE — 93005 ELECTROCARDIOGRAM TRACING: CPT

## 2020-06-05 PROCEDURE — 83605 ASSAY OF LACTIC ACID: CPT

## 2020-06-05 PROCEDURE — 96366 THER/PROPH/DIAG IV INF ADDON: CPT

## 2020-06-05 PROCEDURE — 87040 BLOOD CULTURE FOR BACTERIA: CPT

## 2020-06-05 PROCEDURE — 71045 X-RAY EXAM CHEST 1 VIEW: CPT

## 2020-06-05 PROCEDURE — 93010 ELECTROCARDIOGRAM REPORT: CPT

## 2020-06-05 RX ADMIN — Medication SCH ML: at 23:30

## 2020-06-05 RX ADMIN — Medication SCH: at 14:09

## 2020-06-05 RX ADMIN — PIPERACILLIN AND TAZOBACTAM SCH MLS/HR: 4; .5 INJECTION, POWDER, LYOPHILIZED, FOR SOLUTION INTRAVENOUS; PARENTERAL at 14:35

## 2020-06-05 RX ADMIN — PIPERACILLIN AND TAZOBACTAM SCH MLS/HR: 4; .5 INJECTION, POWDER, LYOPHILIZED, FOR SOLUTION INTRAVENOUS; PARENTERAL at 19:39

## 2020-06-05 NOTE — RADIOLOGY REPORT (SQ)
EXAM DESCRIPTION:  CHEST SINGLE VIEW



IMAGES COMPLETED DATE/TIME:  6/5/2020 1:26 pm



REASON FOR STUDY:  Possible sepsis



COMPARISON:  AP and lateral views of the chest from 10/10/2019



EXAM PARAMETERS:  NUMBER OF VIEWS: One view.

TECHNIQUE:  An AP view of the chest was obtained.

RADIATION DOSE: NA

LIMITATIONS: None.



FINDINGS:  LUNGS AND PLEURA: Increased bilateral basilar predominant opacities that obscure the conto
urs of the hemidiaphragms and blunt the costophrenic sulci.  The horizontal fissure is thickened.  Th
e interstitium is indistinct. There is no pneumothorax.

MEDIASTINUM AND HILAR STRUCTURES: No mediastinal or hilar contour abnormality.

HEART AND VASCULAR STRUCTURES: The cardiac silhouette is enlarged.  The pulmonary vasculature is soo
stinct.

BONES: No acute findings.

HARDWARE: Status post median sternotomy.  There is at intact left subclavian vein approach transvenou
s pacemaker in place.  The tip of the right IJ tunnel hemodialysis catheter projects at the level of 
the cavoatrial junction.

OTHER: No other finding.



IMPRESSION:  Cardiomegaly, bilateral pleural effusions and indistinctness of the interstitium.  Clini
edis correlation to exclude CHF/ pulmonary edema is recommended.



TECHNICAL DOCUMENTATION:  JOB ID:  [id number]

 2011 [employer]- All Rights Reserved



Reading location - IP/workstation name: [email or web address]

## 2020-06-05 NOTE — ER DOCUMENT REPORT
ED Dizziness/Weakness





- General


Chief Complaint: General Weakness


Stated Complaint: GENERAL WEAKNESS


Time Seen by Provider: 06/05/20 13:13


Primary Care Provider: 


SUKHI MCKEE DO [Primary Care Provider] - Follow up as needed


Mode of Arrival: Medic


Information source: POA - Power of 


Notes: 





6-year-old man presents to the emergency department with a history of was at 

dialysis when he became less responsive and hypotensive.  Patient was brought to

the emergency department by EMS, he was gone on Levophed for the hypotension.  

Apparently history of right lower extremity amputation and left lower extremity 

which has some breakdown of the foot and lower leg.  I have spoken to the 

patient's wife who states that she does not want him to have another surgery or 

aggressive intervention.  The patient is DNR/DNI and comfort/palliative care 

only.  In the ER septic work-up was performed and blood cultures done.  Patient 

received Zosyn 3.375 g IV.  He is unable to answer questions and is poorly 

responsive to verbal stimuli.


TRAVEL OUTSIDE OF THE [place] IN LAST 30 DAYS: No





- Related Data


Allergies/Adverse Reactions: 


                                        





No Known Allergies Allergy (Verified 06/05/20 13:22)


   











Past Medical History





- Social History


Smoking Status: Unknown if Ever Smoked


Family History: None


Patient has homicidal ideation: No





- Past Medical History


Cardiac Medical History: Reports: Hx Congestive Heart Failure, Hx Coronary 

Artery Disease, Hx Heart Attack


Pulmonary Medical History: Reports: Hx COPD


Endocrine Medical History: Reports: Hx Diabetes Mellitus Type 1


Renal/ Medical History: Reports: Hx Benign Prostatic Hyperplasia, Hx Renal 

Insufficiency


Past Surgical History: Reports: Hx Coronary Artery Bypass Graft - x3, Hx 

Pacemaker, Hx Vascular Surgery - Carotid endarterectomy





Review of Systems





- Review of Systems


-: Yes ROS unobtainable due to patient's medical condition - Poorly responsive 

and nonverbal.





Physical Exam





- Vital signs


Vitals: 


                                        











Resp


 


 20 06/05/20 12:56














- Notes


Notes: 





PHYSICAL EXAMINATION:


 


Physical Exam:


General: Frail elderly male, chronically ill and hypotensive upon presentation.


HEENT: NC/AT, pupils equal round and reactive to light, MM moist,nares clear, 

oropharynx clear, airway patent


Neck: supple, no adenopathy, no masses.


Lungs: clear, no respiratory distress


CVS: Regular rate and rhythm no murmur gallop or rub


Abdomen: Soft, active, nontender, no masses, no hepatosplenomegaly


Ext: Right AKA, healing stump, left lower extremity with cool distal extremity, 

emaciation of the toes on the left foot and lateral lower leg dark eschar.


Neuro: Poorly responsive, obtunded


Skin: Intact except for left lower extremity.











Course





- Re-evaluation


Re-evalutation: 





06/05/20 16:30


Patient was seen and a sepsis work-up was started, apparently power of ,

wife Allen Tovar has voiced desire for no aggressive interventions, DNR/DNI 

status.  I have explained to the wife that he is critically ill at this time and

likely septic, he had a receiving IV dose of antibiotics and medications to 

support his blood pressure prior to our discussion.  She does not want the pat

ient transferred and states that they would be trying to get hospice set up next

week.





Contacted the hospitalist Dr. Cuevas, patient will be admitted to the hospitalist 

service comfort care/palliative care.


06/05/20 19:45


There was discussion with the wife regarding antibiotic treatment and her desire

to have antibiotic treatment.  I have contacted wife and explained that 

presently, there is no dialysis available at [facility] during the

weekend, if she would request aggressive care, it likely would acquire a 

transfer.  The wife notes that she wanted to talk to her son.  The son called 

and the nurse and I did discuss the gravity of Mr. Tovar's medical condition.  

WBC count 22,400, lactate 5.6, necrotic left lower extremity, elevated liver 

enzymes and renal failure with advanced age.  There is a desire to have no 

further surgery and to have no aggressive intervention would result in death 

with or without antibiotics.











- Vital Signs


Vital signs: 


                                        











Temp Pulse Resp BP Pulse Ox


 


 96.3 F L     23 H  91/41 L  95 


 


 06/05/20 13:22     06/05/20 20:01  06/05/20 20:00  06/05/20 20:01














- Laboratory


Result Diagrams: 


                                 06/05/20 13:03





                                 06/05/20 13:03


Laboratory results interpreted by me: 


                                        











  06/05/20 06/05/20 06/05/20





  13:03 13:03 13:03


 


WBC    22.4 H


 


RBC    3.23 L


 


Hgb    8.4 L


 


Hct    27.7 L


 


MCH    26.1 L


 


MCHC    30.5 L


 


RDW    19.8 H


 


Seg Neuts % (Manual)    98 H


 


Lymphocytes % (Manual)    0 L


 


Monocytes % (Manual)    1 L


 


Abs Neuts (Manual)    22.0 H


 


Abs Lymphs (Manual)    0.0 L


 


PT  34.5 H  


 


Carbonic Acid   


 


ABG pCO2   


 


ABG pO2   


 


ABG HCO3   


 


ABG Total CO2   


 


Sodium   131.7 L 


 


Chloride   97 L 


 


Carbon Dioxide   19 L 


 


BUN   23 H 


 


Creatinine   1.81 H 


 


Est GFR ( Amer)   43 L 


 


Est GFR (MDRD) Non-Af   36 L 


 


Glucose   130 H 


 


Lactic Acid   


 


Total Bilirubin   1.5 H 


 


Direct Bilirubin   1.0 H 


 


AST   5064 H 


 


ALT   2899 H 


 


Alkaline Phosphatase   157 H 


 


Total Protein   4.7 L 


 


Albumin   2.4 L 


 


Urine Protein   


 


Urine Ketones   


 


Urine Blood   


 


Leukocyte Esterase Rfl   














  06/05/20 06/05/20 06/05/20





  13:03 14:10 14:25


 


WBC   


 


RBC   


 


Hgb   


 


Hct   


 


MCH   


 


MCHC   


 


RDW   


 


Seg Neuts % (Manual)   


 


Lymphocytes % (Manual)   


 


Monocytes % (Manual)   


 


Abs Neuts (Manual)   


 


Abs Lymphs (Manual)   


 


PT   


 


Carbonic Acid   0.83 L 


 


ABG pCO2   27.5 L 


 


ABG pO2   70.5 L 


 


ABG HCO3   16.0 L 


 


ABG Total CO2   16.8 L 


 


Sodium   


 


Chloride   


 


Carbon Dioxide   


 


BUN   


 


Creatinine   


 


Est GFR ( Amer)   


 


Est GFR (MDRD) Non-Af   


 


Glucose   


 


Lactic Acid  5.6 H  


 


Total Bilirubin   


 


Direct Bilirubin   


 


AST   


 


ALT   


 


Alkaline Phosphatase   


 


Total Protein   


 


Albumin   


 


Urine Protein    100 H


 


Urine Ketones    TRACE H


 


Urine Blood    LARGE H


 


Leukocyte Esterase Rfl    MODERATE H














  06/05/20





  16:40


 


WBC 


 


RBC 


 


Hgb 


 


Hct 


 


MCH 


 


MCHC 


 


RDW 


 


Seg Neuts % (Manual) 


 


Lymphocytes % (Manual) 


 


Monocytes % (Manual) 


 


Abs Neuts (Manual) 


 


Abs Lymphs (Manual) 


 


PT 


 


Carbonic Acid 


 


ABG pCO2 


 


ABG pO2 


 


ABG HCO3 


 


ABG Total CO2 


 


Sodium 


 


Chloride 


 


Carbon Dioxide 


 


BUN 


 


Creatinine 


 


Est GFR ( Amer) 


 


Est GFR (MDRD) Non-Af 


 


Glucose 


 


Lactic Acid  5.1 H


 


Total Bilirubin 


 


Direct Bilirubin 


 


AST 


 


ALT 


 


Alkaline Phosphatase 


 


Total Protein 


 


Albumin 


 


Urine Protein 


 


Urine Ketones 


 


Urine Blood 


 


Leukocyte Esterase Rfl 











06/05/20 19:58


I have reviewed laboratory data and used this information for the treatment 

decisions regarding the patient.





- Diagnostic Test


Radiology reviewed: Image reviewed, Reports reviewed - Chest x-ray: Cardio

megaly, pulmonary edema/bilateral pleural effusion, CHF/edema.





- EKG Interpretation by Me


Rhythm: Other - Pacemaker, ventricular paced rhythm, 68.





Discharge





- Discharge


Clinical Impression: 


 Palliative care patient, DNR (do not resuscitate), Elevated liver enzymes, End-

stage renal disease on hemodialysis





Hypotension


Qualifiers:


 Hypotension type: other hypotension type Qualified Code(s): I95.89 - Other 

hypotension





Leukocytosis


Qualifiers:


 Leukocytosis type: bandemia Qualified Code(s): D72.825 - Bandemia





Congestive heart failure


Qualifiers:


 Heart failure type: unspecified Heart failure chronicity: unspecified Qualified

Code(s): I50.9 - Heart failure, unspecified





Hx of AKA (above knee amputation)


Qualifiers:


 Laterality: right Qualified Code(s): Z89.611 - Acquired absence of right leg 

above knee





Condition: Serious


Disposition: ADMITTED AS INPATIENT


Admitting Provider: Hussain (Hospitalist)


Unit Admitted: Medical Floor


Referrals: 


SUKHI MCKEE DO [Primary Care Provider] - Follow up as needed

## 2020-06-06 RX ADMIN — FENTANYL CITRATE PRN MCG: 50 INJECTION INTRAMUSCULAR; INTRAVENOUS at 02:17

## 2020-06-06 RX ADMIN — MORPHINE SULFATE PRN MG: 10 INJECTION INTRAMUSCULAR; INTRAVENOUS; SUBCUTANEOUS at 08:48

## 2020-06-06 RX ADMIN — Medication SCH ML: at 06:20

## 2020-06-06 RX ADMIN — Medication SCH: at 13:57

## 2020-06-06 RX ADMIN — MORPHINE SULFATE PRN MG: 10 INJECTION INTRAMUSCULAR; INTRAVENOUS; SUBCUTANEOUS at 16:41

## 2020-06-06 RX ADMIN — MORPHINE SULFATE PRN MG: 10 INJECTION INTRAMUSCULAR; INTRAVENOUS; SUBCUTANEOUS at 20:34

## 2020-06-06 RX ADMIN — MORPHINE SULFATE PRN MG: 10 INJECTION INTRAMUSCULAR; INTRAVENOUS; SUBCUTANEOUS at 12:19

## 2020-06-06 RX ADMIN — FENTANYL CITRATE PRN MCG: 50 INJECTION INTRAMUSCULAR; INTRAVENOUS at 06:18

## 2020-06-06 RX ADMIN — PIPERACILLIN AND TAZOBACTAM SCH: 4; .5 INJECTION, POWDER, LYOPHILIZED, FOR SOLUTION INTRAVENOUS; PARENTERAL at 01:03

## 2020-06-06 RX ADMIN — Medication SCH ML: at 23:28

## 2020-06-06 RX ADMIN — MORPHINE SULFATE PRN MG: 10 INJECTION INTRAMUSCULAR; INTRAVENOUS; SUBCUTANEOUS at 23:53

## 2020-06-06 NOTE — EKG REPORT
SEVERITY:- ABNORMAL ECG -

VENTRICULAR-PACED RHYTHM

:

Confirmed by: Delma Johnson 06-Jun-2020 20:48:03

## 2020-06-06 NOTE — PDOC H&P
History of Present Illness


Admission Date/PCP: 


  06/05/20 20:30





  SUKHI MCKEE DO





Patient complains of: Weakness


History of Present Illness: 


JHON RAMIREZ is a 86 year old male with extensive past medical history of end-

stage renal failure on hemodialysis, dementia, peripheral vascular disease 

status post right and left lower extremity amputation and sacral decubitus.  He 

presents after found hypotensive during dialysis which is discontinued 

prematurely was brought to the emergency room by EMS requiring Levophed.  In the

emergency department he is altered and nonverbal with severe hypotension and 

sepsis with several necrotic ulcers of the lower extremity and recent amputation

site with livedo reticularis.  The patient's wife who is power of  

declines any further aggressive management including central line placement, 

dialysis or surgery wishing for comfort measures only.  Levo fed is discontinued

and he is transferred to the hospitalist for inpatient comfort care.  Patient's 

CODE STATUS is verified is DNR.





Past Medical History


Cardiac Medical History: Reports: Congestive Heart Failure, Coronary Artery 

Disease, Myocardial Infarction


Pulmonary Medical History: Reports: Chronic Obstructive Pulmonary Disease (COPD)


Endocrine Medical History: Reports: Diabetes Mellitus Type 1


Psychiatric Medical History: Reports: Depression





Past Surgical History


Past Surgical History: Reports: Coronary Artery Bypass Graft - x3, Pacemaker, 

Vascular Surgery - Carotid endarterectomy





Social History


Information Source: [facility] Records


Lives with: Spouse/Significant other


Smoking Status: Unknown if Ever Smoked


Frequency of Alcohol Use: None


Drugs: None





- Advance Directive


Resuscitation Status: Do Not Resuscitate





Family History


Family History: Other - Unobtainable


Parental Family History Reviewed: No - Unobtainable


Children Family History Reviewed: No - Unobtainable


Sibling(s) Family History Reviewed.: No - Unobtainable





Medication/Allergy


Home Medications: 








Albuterol Sulfate [Albuterol Sulfate Hfa] 8.5 gm IH Q6 04/21/20 


Amiodarone HCl [Cordarone 200 mg Tablet] 200 mg PO DAILY 04/21/20 


Ascorbic Acid [Vitamin C 500 mg Tablet] 500 mg PO DAILY 04/21/20 


Aspirin [Adult Aspirin Regimen] 81 mg PO DAILY 04/21/20 


Calcitriol [Rocaltrol 0.25 Mcg Capsule] 1 cap PO MOWEFR@1000 04/21/20 


Cholecalciferol (Vitamin D3) [Vitamin D3 1000 Unit Tablet] 1,000 unit PO DAILY 

04/21/20 


Collagenase Clostridium Hist. [Santyl Ointment 30 gm] 1 applic TP DAILY 04/21/20




Cyanocobalamin (Vitamin B-12) [Vitamin B-12] 1,000 mcg PO BID 04/21/20 


Insulin Glargine,Hum.rec.anlog [Lantus Insulin 100 Unit/mL Insulin Pen] 0 unit 

SUBCUT .PER SLIDING SCALE 04/21/20 


Sacubitril/Valsartan [Entresto 24 mg/26 mg Tablet] 1 each PO Q12 04/21/20 


Sennosides [Senna Laxative] 17.2 mg PO DAILY 04/21/20 


Simvastatin 10 mg PO QHS 04/21/20 


Tamsulosin HCl [Flomax] 0.4 mg PO QHS 04/21/20 


Torsemide [Demadex 20 mg Tablet] 20 mg PO Q12 04/21/20 


Warfarin Sodium [Coumadin 2 mg Tablet] 1 mg PO .MOWETHSASU@1000 04/21/20 


Warfarin Sodium [Coumadin 2 mg Tablet] 2 mg PO TUFR@1000 04/21/20 








Allergies/Adverse Reactions: 


                                        





No Known Allergies Allergy (Verified 06/05/20 13:22)


   











Review of Systems


ROS unobtainable: Due to mental status





Physical Exam


Vital Signs: 


                                        











Temp Pulse Resp BP Pulse Ox


 


 97.9 F   74   16   90/34 L  90 L


 


 06/05/20 23:46  06/05/20 23:46  06/05/20 23:46  06/05/20 23:46  06/05/20 23:46








                                 Intake & Output











 06/04/20 06/05/20 06/06/20





 11:59 11:59 11:59


 


Intake Total   297


 


Balance   297











General appearance: PRESENT: severe distress, thin, other - Critically ill-

appearing, bilateral temporal wasting and cachexia.  ABSENT: well-developed, 

well-nourished


Exam: 





Chronic and critically ill-appearing


Head exam: PRESENT: atraumatic, normocephalic


Eye exam: PRESENT: conjunctiva pink, EOMI, PERRLA.  ABSENT: scleral icterus


Ear exam: PRESENT: normal external ear exam


Mouth exam: PRESENT: moist, tongue midline


Neck exam: ABSENT: carotid bruit, JVD, lymphadenopathy, thyromegaly


Respiratory exam: PRESENT: accessory muscle use, crackles.  ABSENT: rales, 

rhonchi, wheezes


Cardiovascular exam: PRESENT: RRR, tachycardia.  ABSENT: diastolic murmur, rubs,

systolic murmur


Pulses: PRESENT: other - Cool extremities with livedo reticularis and unpalpable

pulses


Vascular exam: PRESENT: normal capillary refill


GI/Abdominal exam: PRESENT: normal bowel sounds, soft.  ABSENT: distended, 

guarding, mass, organolmegaly, rebound, tenderness


Rectal exam: PRESENT: deferred


Extremities exam: PRESENT: other - Widespread livedo reticularis with cool 

extremities and unpalpable pulses


Neurological exam: PRESENT: altered, CN II-XII grossly intact.  ABSENT: oriented

to time, oriented to situation, reflexes normal


Psychiatric exam: PRESENT: appropriate affect.  ABSENT: homicidal ideation, 

suicidal ideation


Skin exam: PRESENT: other - Multiple ischemic, necrotic distal ulcers, stage IV 

sacral ulcer and fluctuant drainage from amputation site..  ABSENT: cyanosis, 

rash





Results


Laboratory Results: 


                                        





                                 06/05/20 13:03 





                                 06/05/20 13:03 





                                        











  06/05/20 06/05/20 06/05/20





  13:03 13:03 13:03


 


WBC   22.4 H 


 


RBC   3.23 L 


 


Hgb   8.4 L 


 


Hct   27.7 L 


 


MCV   86 


 


MCH   26.1 L 


 


MCHC   30.5 L 


 


RDW   19.8 H 


 


Plt Count   166 


 


Seg Neutrophils %   Not Reportable 


 


Carbonic Acid   


 


HCO3/H2CO3 Ratio   


 


ABG pH   


 


ABG pCO2   


 


ABG pO2   


 


ABG HCO3   


 


ABG O2 Saturation   


 


ABG Base Excess   


 


FiO2   


 


Sodium  131.7 L  


 


Potassium  3.9  


 


Chloride  97 L  


 


Carbon Dioxide  19 L  


 


Anion Gap  16  


 


BUN  23 H  


 


Creatinine  1.81 H  


 


Est GFR ( Amer)  43 L  


 


Glucose  130 H  


 


Lactic Acid    5.6 H


 


Calcium  8.4  


 


Total Bilirubin  1.5 H  


 


AST  5064 H  


 


Alkaline Phosphatase  157 H  


 


Total Protein  4.7 L  


 


Albumin  2.4 L  


 


Urine Color   


 


Urine Appearance   


 


Urine pH   


 


Ur Specific Gravity   


 


Urine Protein   


 


Urine Glucose (UA)   


 


Urine Ketones   


 


Urine Blood   


 


Urine RBC (Auto)   














  06/05/20 06/05/20 06/05/20





  14:10 14:25 16:40


 


WBC   


 


RBC   


 


Hgb   


 


Hct   


 


MCV   


 


MCH   


 


MCHC   


 


RDW   


 


Plt Count   


 


Seg Neutrophils %   


 


Carbonic Acid  0.83 L  


 


HCO3/H2CO3 Ratio  19:1  


 


ABG pH  7.38  


 


ABG pCO2  27.5 L  


 


ABG pO2  70.5 L  


 


ABG HCO3  16.0 L  


 


ABG O2 Saturation  94.3  


 


ABG Base Excess  -8.0  


 


FiO2  28%  


 


Sodium   


 


Potassium   


 


Chloride   


 


Carbon Dioxide   


 


Anion Gap   


 


BUN   


 


Creatinine   


 


Est GFR (African Amer)   


 


Glucose   


 


Lactic Acid    5.1 H


 


Calcium   


 


Total Bilirubin   


 


AST   


 


Alkaline Phosphatase   


 


Total Protein   


 


Albumin   


 


Urine Color   YELLOW 


 


Urine Appearance   TURBID 


 


Urine pH   5.0 


 


Ur Specific Gravity   1.023 


 


Urine Protein   100 H 


 


Urine Glucose (UA)   NEGATIVE 


 


Urine Ketones   TRACE H 


 


Urine Blood   LARGE H 


 


Urine RBC (Auto)   179 














  06/05/20





  20:18


 


WBC 


 


RBC 


 


Hgb 


 


Hct 


 


MCV 


 


MCH 


 


MCHC 


 


RDW 


 


Plt Count 


 


Seg Neutrophils % 


 


Carbonic Acid 


 


HCO3/H2CO3 Ratio 


 


ABG pH 


 


ABG pCO2 


 


ABG pO2 


 


ABG HCO3 


 


ABG O2 Saturation 


 


ABG Base Excess 


 


FiO2 


 


Sodium 


 


Potassium 


 


Chloride 


 


Carbon Dioxide 


 


Anion Gap 


 


BUN 


 


Creatinine 


 


Est GFR (African Amer) 


 


Glucose 


 


Lactic Acid  4.0 H


 


Calcium 


 


Total Bilirubin 


 


AST 


 


Alkaline Phosphatase 


 


Total Protein 


 


Albumin 


 


Urine Color 


 


Urine Appearance 


 


Urine pH 


 


Ur Specific Gravity 


 


Urine Protein 


 


Urine Glucose (UA) 


 


Urine Ketones 


 


Urine Blood 


 


Urine RBC (Auto) 











Impressions: 


                                        





Chest X-Ray  06/05/20 13:14


IMPRESSION:  Cardiomegaly, bilateral pleural effusions and indistinctness of the

interstitium.  Clinical correlation to exclude CHF/ pulmonary edema is 

recommended.


 














Assessment and Plan





- Diagnosis


(1) Severe sepsis


Is this a current diagnosis for this admission?: Yes   


Plan: 


Multiorgan failure, bilateral extremity gangrene, intolerant of aggressive 

measures patient's wife sensibly wishes for comfort measures only.








(2) Comfort measures only status


Is this a current diagnosis for this admission?: Yes   


Plan: 


Obtunded, fentanyl as needed








- Time


Time Spent with patient: 15-24 minutes





- Inpatient Certification


Medical Necessity: Need Close Monitoring Due to Risk of Patient Decompensation

## 2020-06-06 NOTE — PDOC PROGRESS REPORT
Subjective


Progress Note for:: 06/06/20


Subjective:: 


JHON RAMIREZ is a 86 year old male with extensive past medical history of end-

stage renal failure on hemodialysis, dementia, peripheral vascular disease 

status post right and left lower extremity amputation and sacral decubitus who 

was admitted 6/5/20 with severe sepsis for comfort care measures.





Patient was seen on morning rounds.  He was found resting in bed, comfortably, 

having just received prn Morphine.  Per nursing, patient is occasional arousable

and calls out.  He does not follow directions or answer questions.


ROS is limited r/t mental status.


No concerns per nursing.


Reason For Visit: 


SEPSIS CKD CMO








Physical Exam


Vital Signs: 


                                        











Temp Pulse Resp BP Pulse Ox


 


 97.6 F   72   22 H  93/36 L  83 L


 


 06/06/20 12:17  06/06/20 12:17  06/06/20 12:17  06/06/20 12:17  06/06/20 12:17








                                 Intake & Output











 06/05/20 06/06/20 06/07/20





 06:59 06:59 06:59


 


Intake Total  297 


 


Balance  297 


 


Weight  74.3 kg 











General appearance: PRESENT: other - chronic and critically ill appearing


Head exam: PRESENT: atraumatic, normocephalic


Eye exam: PRESENT: conjunctiva pink, EOMI, PERRLA.  ABSENT: scleral icterus


Mouth exam: PRESENT: dry mucosa


Teeth exam: PRESENT: edentulous


Respiratory exam: PRESENT: accessory muscle use, decreased breath sounds, 

symmetrical, tachypnea, other - shallow


Cardiovascular exam: PRESENT: RRR


Pulses: PRESENT: other - unable to ptalpate pedal pulse; leg mottled and cool to

mid-thigh.  ABSENT: +1 pedal pulses bilateral


Neurological exam: PRESENT: altered


Skin exam: PRESENT: other - Multiple ischemic, necrotic distal ulcers, stage IV 

sacral ulcer and fluctuant drainage from amputation site.  ABSENT: intact





Results


Laboratory Results: 


                                        





                                 06/05/20 13:03 





                                 06/05/20 13:03 





                                        











  06/05/20 06/05/20 06/05/20





  13:03 13:03 14:10


 


WBC   22.4 H 


 


RBC   3.23 L 


 


Hgb   8.4 L 


 


Hct   27.7 L 


 


MCV   86 


 


MCH   26.1 L 


 


MCHC   30.5 L 


 


RDW   19.8 H 


 


Plt Count   166 


 


Carbonic Acid    0.83 L


 


HCO3/H2CO3 Ratio    19:1


 


ABG pH    7.38


 


ABG pCO2    27.5 L


 


ABG pO2    70.5 L


 


ABG HCO3    16.0 L


 


ABG O2 Saturation    94.3


 


ABG Base Excess    -8.0


 


FiO2    28%


 


Sodium  131.7 L  


 


Potassium  3.9  


 


Chloride  97 L  


 


Carbon Dioxide  19 L  


 


Anion Gap  16  


 


BUN  23 H  


 


Creatinine  1.81 H  


 


Est GFR ( Amer)  43 L  


 


Glucose  130 H  


 


Lactic Acid   


 


Calcium  8.4  


 


Total Bilirubin  1.5 H  


 


AST  5064 H  


 


Alkaline Phosphatase  157 H  


 


Total Protein  4.7 L  


 


Albumin  2.4 L  


 


Urine Color   


 


Urine Appearance   


 


Urine pH   


 


Ur Specific Gravity   


 


Urine Protein   


 


Urine Glucose (UA)   


 


Urine Ketones   


 


Urine Blood   


 


Urine RBC (Auto)   














  06/05/20 06/05/20 06/05/20





  14:25 16:40 20:18


 


WBC   


 


RBC   


 


Hgb   


 


Hct   


 


MCV   


 


MCH   


 


MCHC   


 


RDW   


 


Plt Count   


 


Carbonic Acid   


 


HCO3/H2CO3 Ratio   


 


ABG pH   


 


ABG pCO2   


 


ABG pO2   


 


ABG HCO3   


 


ABG O2 Saturation   


 


ABG Base Excess   


 


FiO2   


 


Sodium   


 


Potassium   


 


Chloride   


 


Carbon Dioxide   


 


Anion Gap   


 


BUN   


 


Creatinine   


 


Est GFR (African Amer)   


 


Glucose   


 


Lactic Acid   5.1 H  4.0 H


 


Calcium   


 


Total Bilirubin   


 


AST   


 


Alkaline Phosphatase   


 


Total Protein   


 


Albumin   


 


Urine Color  YELLOW  


 


Urine Appearance  TURBID  


 


Urine pH  5.0  


 


Ur Specific Gravity  1.023  


 


Urine Protein  100 H  


 


Urine Glucose (UA)  NEGATIVE  


 


Urine Ketones  TRACE H  


 


Urine Blood  LARGE H  


 


Urine RBC (Auto)  179  











Impressions: 


                                        





Chest X-Ray  06/05/20 13:14


IMPRESSION:  Cardiomegaly, bilateral pleural effusions and indistinctness of the

interstitium.  Clinical correlation to exclude CHF/ pulmonary edema is 

recommended.


 














Assessment and Plan





- Diagnosis


(1) Comfort measures only status


Is this a current diagnosis for this admission?: Yes   


Plan: 


Comfort Care order set deployed.


As needed IV morphine, IV Ativan, VT Tylenol, and SL Atropine gtt for symptom 

management.


Discharge planning consulted as patient's wife had indicated desire to return 

home with hospice services.








(2) Severe sepsis


Is this a current diagnosis for this admission?: Yes   


Plan: 


Multiorgan failure, bilateral extremity gangrene, intolerant of aggressive 

measures.


Patient's wife sensibly wishes for comfort measures only.








(3) Shock liver


Is this a current diagnosis for this admission?: Yes   





(4) Congestive heart failure


Qualifiers: 


   Heart failure chronicity: acute on chronic   Qualified Code(s): I50.9 - Heart

failure, unspecified   


Is this a current diagnosis for this admission?: Yes   





(5) End-stage renal disease on hemodialysis


Is this a current diagnosis for this admission?: Yes   





(6) Hx of AKA (above knee amputation)


Qualifiers: 


   Laterality: right   Qualified Code(s): Z89.611 - Acquired absence of right 

leg above knee   


Is this a current diagnosis for this admission?: Yes   





- Time


Time Spent with patient: 35 or more minutes


Medications reviewed and adjusted accordingly: Yes

## 2020-06-07 VITALS — SYSTOLIC BLOOD PRESSURE: 103 MMHG | DIASTOLIC BLOOD PRESSURE: 41 MMHG

## 2020-06-07 RX ADMIN — Medication SCH ML: at 05:06

## 2020-06-07 RX ADMIN — MORPHINE SULFATE PRN MG: 10 INJECTION INTRAMUSCULAR; INTRAVENOUS; SUBCUTANEOUS at 21:05

## 2020-06-07 RX ADMIN — Medication SCH ML: at 21:05

## 2020-06-07 RX ADMIN — MORPHINE SULFATE PRN MG: 10 INJECTION INTRAMUSCULAR; INTRAVENOUS; SUBCUTANEOUS at 13:03

## 2020-06-07 RX ADMIN — Medication SCH: at 13:13

## 2020-06-07 RX ADMIN — MORPHINE SULFATE PRN MG: 10 INJECTION INTRAMUSCULAR; INTRAVENOUS; SUBCUTANEOUS at 05:04

## 2020-06-07 RX ADMIN — MORPHINE SULFATE PRN MG: 10 INJECTION INTRAMUSCULAR; INTRAVENOUS; SUBCUTANEOUS at 09:06

## 2020-06-07 RX ADMIN — MORPHINE SULFATE PRN MG: 10 INJECTION INTRAMUSCULAR; INTRAVENOUS; SUBCUTANEOUS at 18:29

## 2020-06-08 RX ADMIN — MORPHINE SULFATE PRN MG: 10 INJECTION INTRAMUSCULAR; INTRAVENOUS; SUBCUTANEOUS at 01:53

## 2020-06-08 RX ADMIN — MORPHINE SULFATE PRN MG: 10 INJECTION INTRAMUSCULAR; INTRAVENOUS; SUBCUTANEOUS at 04:24

## 2020-06-08 NOTE — DEATH SUMMARY
Death Summary


Date : 20


Time of Death:: 05:00


Autopsy: No


Resuscitation Status: Comfort Measures Only





- Final Diagnosis


(1) Comfort measures only status


Is this a current diagnosis for this admission?: Yes   





(2) Severe sepsis


Is this a current diagnosis for this admission?: Yes   





(3) Shock liver


Is this a current diagnosis for this admission?: Yes   





(4) Congestive heart failure


Is this a current diagnosis for this admission?: Yes   





(5) End-stage renal disease on hemodialysis


Is this a current diagnosis for this admission?: Yes   





(6) Hx of AKA (above knee amputation)


Is this a current diagnosis for this admission?: Yes   


Hospital Course:: 


Per H&P: JHON RAMIREZ is a 86 year old male with extensive past medical history

of end-stage renal failure on hemodialysis, dementia, peripheral vascular 

disease status post right and left lower extremity amputation and sacral 

decubitus.  He presents after found hypotensive during dialysis which is 

discontinued prematurely was brought to the emergency room by EMS requiring 

Levophed.  In the emergency department he is altered and nonverbal with severe 

hypotension and sepsis with several necrotic ulcers of the lower extremity and 

recent amputation site with livedo reticularis.  The patient's wife who is power

of  declines any further aggressive management including central line 

placement, dialysis or surgery wishing for comfort measures only.  Levo fed is 

discontinued and he is transferred to the hospitalist for inpatient comfort 

care.  Patient's CODE STATUS is verified is DNR.





Course:  Patient was admitted to the medical floor for Comfort Care measures.  

Initially, the patient's wife expressed interest in discharge to home with 

hospice services; however, the patient's condition was too unstable for 

discharge.  The patient's symptoms were managed by IV Morphine and Ativan as 

needed.  Arrangements were made for the patient's family members to be at 

bedside.


His clinical decline proceeded as expected and he  at 0500 on 20.